# Patient Record
Sex: FEMALE | Race: WHITE | Employment: OTHER | URBAN - METROPOLITAN AREA
[De-identification: names, ages, dates, MRNs, and addresses within clinical notes are randomized per-mention and may not be internally consistent; named-entity substitution may affect disease eponyms.]

---

## 2017-02-07 ENCOUNTER — TELEPHONE (OUTPATIENT)
Dept: DERMATOLOGY CLINIC | Facility: CLINIC | Age: 62
End: 2017-02-07

## 2017-02-08 RX ORDER — AZELAIC ACID 0.15 G/G
GEL TOPICAL
Qty: 50 G | Refills: 0 | Status: SHIPPED | OUTPATIENT
Start: 2017-02-08 | End: 2018-01-25

## 2017-02-08 NOTE — TELEPHONE ENCOUNTER
ERX'd 1 RF for Finacea to [facility].  Left detailed message for pt to confirm this was done and remind about f/u on Feb 15.

## 2017-02-15 ENCOUNTER — OFFICE VISIT (OUTPATIENT)
Dept: DERMATOLOGY CLINIC | Facility: CLINIC | Age: 62
End: 2017-02-15

## 2017-02-15 DIAGNOSIS — D23.9 BENIGN NEOPLASM OF SKIN, UNSPECIFIED LOCATION: ICD-10-CM

## 2017-02-15 DIAGNOSIS — L82.1 SEBORRHEIC KERATOSES: ICD-10-CM

## 2017-02-15 DIAGNOSIS — L71.9 ROSACEA: ICD-10-CM

## 2017-02-15 DIAGNOSIS — L81.4 LENTIGO: ICD-10-CM

## 2017-02-15 DIAGNOSIS — L57.0 AK (ACTINIC KERATOSIS): Primary | ICD-10-CM

## 2017-02-15 PROCEDURE — 17000 DESTRUCT PREMALG LESION: CPT | Performed by: DERMATOLOGY

## 2017-02-15 PROCEDURE — 99213 OFFICE O/P EST LOW 20 MIN: CPT | Performed by: DERMATOLOGY

## 2017-02-15 RX ORDER — POVIDONE/POLYVINYL ALCOHOL 20; 27 G/1000ML; G/1000ML
SOLUTION/ DROPS OPHTHALMIC
Refills: 0 | COMMUNITY
Start: 2017-02-08 | End: 2017-11-03

## 2017-02-15 NOTE — PROGRESS NOTES
Darci Aguirre is a 57year old female. HPI:     CC:  Patient presents with:  Rosacea: LOV 10/12/2015. Pt presenting with rosacea. Currently using Finacea for treatment. Lesion: Pt presenting with lesions to temples.  Pt denies personal and family hx of HCl (DYNACIN) 75 MG Oral Tab take 1 tablet (75MG)  by oral route  every dayTake  by mouth.  take 1 tablet (75MG)  by oral route  every day Disp: 30 tablet Rfl: 6     Allergies:   No Known Allergies    Past Medical History   Diagnosis Date   • Fracture of ri with:  Rosacea: LOV 10/12/2015. Pt presenting with rosacea. Currently using Finacea for treatment. Lesion: Pt presenting with lesions to temples. Pt denies personal and family hx of skin cancer. Patient presents with concerns above.     Patient has be nature discussed. Patient will let us know how they are doing over the next several weeks. Await clinical response to above therapy. Flares intermittently add metrocr. Continue finacea she lines gel.   Po takes rarely    Mult sk's at hairline, arms ba

## 2017-11-03 ENCOUNTER — OFFICE VISIT (OUTPATIENT)
Dept: INTERNAL MEDICINE CLINIC | Facility: CLINIC | Age: 62
End: 2017-11-03

## 2017-11-03 VITALS
HEIGHT: 62 IN | BODY MASS INDEX: 25.58 KG/M2 | WEIGHT: 139 LBS | OXYGEN SATURATION: 96 % | HEART RATE: 74 BPM | SYSTOLIC BLOOD PRESSURE: 116 MMHG | TEMPERATURE: 99 F | DIASTOLIC BLOOD PRESSURE: 84 MMHG | RESPIRATION RATE: 18 BRPM

## 2017-11-03 DIAGNOSIS — E78.1 HYPERTRIGLYCERIDEMIA: ICD-10-CM

## 2017-11-03 DIAGNOSIS — E55.9 VITAMIN D DEFICIENCY: ICD-10-CM

## 2017-11-03 DIAGNOSIS — M15.9 PRIMARY OSTEOARTHRITIS INVOLVING MULTIPLE JOINTS: ICD-10-CM

## 2017-11-03 DIAGNOSIS — M47.812 OSTEOARTHRITIS OF CERVICAL SPINE, UNSPECIFIED SPINAL OSTEOARTHRITIS COMPLICATION STATUS: ICD-10-CM

## 2017-11-03 DIAGNOSIS — Z00.00 ANNUAL PHYSICAL EXAM: Primary | ICD-10-CM

## 2017-11-03 DIAGNOSIS — M54.31 SCIATICA OF RIGHT SIDE: ICD-10-CM

## 2017-11-03 DIAGNOSIS — E78.9 BORDERLINE HIGH CHOLESTEROL: ICD-10-CM

## 2017-11-03 DIAGNOSIS — R53.83 FATIGUE, UNSPECIFIED TYPE: ICD-10-CM

## 2017-11-03 DIAGNOSIS — M79.604 PAIN OF RIGHT LOWER EXTREMITY: ICD-10-CM

## 2017-11-03 DIAGNOSIS — L71.9 ROSACEA: ICD-10-CM

## 2017-11-03 PROBLEM — M15.0 PRIMARY OSTEOARTHRITIS INVOLVING MULTIPLE JOINTS: Status: ACTIVE | Noted: 2017-11-03

## 2017-11-03 PROCEDURE — 99214 OFFICE O/P EST MOD 30 MIN: CPT | Performed by: INTERNAL MEDICINE

## 2017-11-03 PROCEDURE — 93005 ELECTROCARDIOGRAM TRACING: CPT | Performed by: INTERNAL MEDICINE

## 2017-11-03 PROCEDURE — 99396 PREV VISIT EST AGE 40-64: CPT | Performed by: INTERNAL MEDICINE

## 2017-11-03 PROCEDURE — 93010 ELECTROCARDIOGRAM REPORT: CPT | Performed by: INTERNAL MEDICINE

## 2017-11-03 NOTE — PATIENT INSTRUCTIONS
1.  Patient is to continue her current diet, medications and activity. 2.  Patient is to take ibuprofen 2 tablets 3 or 4 times a day with meals or food for 2 weeks. She then may use ibuprofen as needed after that.   3.  Patient to call me in 2 or 3 weeks

## 2017-11-03 NOTE — PROGRESS NOTES
HPI:   Rachel Love is a 57year old female who was seen by me on November 3, 2017 for her annual physical examination. At the time the examination Mrs. Betsy Villegas was feeling well. She does complain of some pain involving her right leg.   She notes alexa Smokeless tobacco: Never Used                      Comment: Please verify with patient. Per NextGen:             \"Tobacco Use - Former. \"  Alcohol use: Yes           0.0 oz/week     Comment: (beer & w patient did not have blood work taken prior to today's exam.  At the conclusion of the exam the patient was given order for blood tests and urinalysis. When these are available over discuss results with the patient. ASSESSMENT AND PLAN:   1.  Annual p PANEL; Future    5. Hypertriglyceridemia  Patient had a history of elevated triglyceride readings in the past.  I will await the results of the patient's lipid panel which will be done in the near future.   I will discuss results with the patient when they How would you describe your daily physical activity?: Light    How would you describe your current health state?: Good    How do you maintain positive mental well-being?: Social Interaction; Visiting Friends; Visiting Family    If you are a male age 36-11 carline:  No I have trouble hearing conversations in a noisy background such as a crowded room or restaurant:  Sometimes   I get confused about where sounds come from:  No I misunderstand some words in a sentence and need to ask people to repeat themselve

## 2017-11-16 ENCOUNTER — LAB ENCOUNTER (OUTPATIENT)
Dept: LAB | Facility: HOSPITAL | Age: 62
End: 2017-11-16
Attending: INTERNAL MEDICINE
Payer: COMMERCIAL

## 2017-11-16 DIAGNOSIS — E78.9 BORDERLINE HIGH CHOLESTEROL: ICD-10-CM

## 2017-11-16 DIAGNOSIS — E55.9 VITAMIN D DEFICIENCY: ICD-10-CM

## 2017-11-16 DIAGNOSIS — R53.83 FATIGUE, UNSPECIFIED TYPE: ICD-10-CM

## 2017-11-16 DIAGNOSIS — Z00.00 ANNUAL PHYSICAL EXAM: ICD-10-CM

## 2017-11-16 DIAGNOSIS — E78.1 HYPERTRIGLYCERIDEMIA: ICD-10-CM

## 2017-11-16 PROCEDURE — 36415 COLL VENOUS BLD VENIPUNCTURE: CPT

## 2017-11-16 PROCEDURE — 80053 COMPREHEN METABOLIC PANEL: CPT

## 2017-11-16 PROCEDURE — 84443 ASSAY THYROID STIM HORMONE: CPT

## 2017-11-16 PROCEDURE — 85025 COMPLETE CBC W/AUTO DIFF WBC: CPT

## 2017-11-16 PROCEDURE — 80061 LIPID PANEL: CPT

## 2017-11-16 PROCEDURE — 82306 VITAMIN D 25 HYDROXY: CPT

## 2017-11-16 PROCEDURE — 81003 URINALYSIS AUTO W/O SCOPE: CPT

## 2017-11-21 ENCOUNTER — TELEPHONE (OUTPATIENT)
Dept: INTERNAL MEDICINE CLINIC | Facility: CLINIC | Age: 62
End: 2017-11-21

## 2017-11-21 DIAGNOSIS — E78.1 HYPERTRIGLYCERIDEMIA: Primary | ICD-10-CM

## 2017-11-21 NOTE — TELEPHONE ENCOUNTER
Telephone call to pt and message left. Pt's recent blood tests and U/A have turned out well except that her Triglycerides are elevate to 280. Pt is to watch her diet especially sweets and carbs and alcohol if she is drinking any.   Please mail pt a copy of

## 2017-11-22 NOTE — TELEPHONE ENCOUNTER
Pt notified labs and ua ok / DR KAPLAN   trigycerides elevated at 280--pt to watch diet nahomy sweets  Carbohydrates and alcohol  AHA diet mailed to pt  Repeat lipid in Feb  orders entered in computer   Pt understands all instructions

## 2018-01-17 ENCOUNTER — OFFICE VISIT (OUTPATIENT)
Dept: INTERNAL MEDICINE CLINIC | Facility: CLINIC | Age: 63
End: 2018-01-17

## 2018-01-17 VITALS
SYSTOLIC BLOOD PRESSURE: 130 MMHG | DIASTOLIC BLOOD PRESSURE: 76 MMHG | HEART RATE: 68 BPM | WEIGHT: 140 LBS | OXYGEN SATURATION: 98 % | BODY MASS INDEX: 26 KG/M2 | TEMPERATURE: 98 F

## 2018-01-17 DIAGNOSIS — E78.1 HYPERTRIGLYCERIDEMIA: ICD-10-CM

## 2018-01-17 DIAGNOSIS — M25.532 LEFT WRIST PAIN: Primary | ICD-10-CM

## 2018-01-17 DIAGNOSIS — M67.40 GANGLION CYST: ICD-10-CM

## 2018-01-17 PROCEDURE — 99212 OFFICE O/P EST SF 10 MIN: CPT | Performed by: INTERNAL MEDICINE

## 2018-01-17 PROCEDURE — 99213 OFFICE O/P EST LOW 20 MIN: CPT | Performed by: INTERNAL MEDICINE

## 2018-01-17 NOTE — PROGRESS NOTES
Yessenia Grover is a 57year old female. Patient presents with:  Wrist Pain: left   swelling     HPI:   Patient presents with:  Wrist Pain: left   swelling     Patient has developed some swelling associated with some pain in her left medial wrist which is Wt 140 lb (63.5 kg)   SpO2 98%   BMI 25.61 kg/m²   GENERAL: well developed, well nourished in no acute distress  HEENT: normal oropharynx,. Ears are normal. Eyes are normal  EXTREMITIES: no edema.   His left medial wrist region has 3 areas of small ganglio

## 2018-01-17 NOTE — PATIENT INSTRUCTIONS
1.  Patient is to continue her current diet, medications and activity. 2.  Patient to apply warm moist compresses to the left medial wrist 3 or 4 times a day for 10-15 minutes at a time.   3.  Patient take ibuprofen 2 tablets 3 or 4 times a day with food o

## 2018-01-25 ENCOUNTER — TELEPHONE (OUTPATIENT)
Dept: DERMATOLOGY CLINIC | Facility: CLINIC | Age: 63
End: 2018-01-25

## 2018-01-25 RX ORDER — AZELAIC ACID 0.15 G/G
GEL TOPICAL
Qty: 50 G | Refills: 6 | Status: SHIPPED | OUTPATIENT
Start: 2018-01-25 | End: 2018-05-11

## 2018-04-23 ENCOUNTER — OFFICE VISIT (OUTPATIENT)
Dept: SURGERY | Facility: CLINIC | Age: 63
End: 2018-04-23

## 2018-04-23 ENCOUNTER — TELEPHONE (OUTPATIENT)
Dept: SURGERY | Facility: CLINIC | Age: 63
End: 2018-04-23

## 2018-04-23 DIAGNOSIS — M67.432 GANGLION OF LEFT WRIST: Primary | ICD-10-CM

## 2018-04-23 PROCEDURE — G0463 HOSPITAL OUTPT CLINIC VISIT: HCPCS | Performed by: PLASTIC SURGERY

## 2018-04-23 PROCEDURE — 99203 OFFICE O/P NEW LOW 30 MIN: CPT | Performed by: PLASTIC SURGERY

## 2018-04-23 RX ORDER — HYDROCODONE BITARTRATE AND ACETAMINOPHEN 7.5; 325 MG/1; MG/1
TABLET ORAL
Qty: 25 TABLET | Refills: 0 | Status: SHIPPED | OUTPATIENT
Start: 2018-04-23 | End: 2018-04-25

## 2018-04-23 NOTE — H&P
Koffi Bhatia is a 70year old female that presents with Patient presents with:  Ganglion: L volar wrist  .    REFERRED BY:  Matthew Roach    Pacemaker: No  Latex Allergy: no  Coumadin: No  Plavix: No  Other anticoagulants: No  Cardiac stents: No per NextGen   • Osteoporosis screening 05-    per NextGen   • Other ill-defined conditions(799.89) 2008    per NextGen:  \"bulging discs; management:  surgery\"   • Rosacea     Management:  tetracycline p.r.n. Past Surgical History:  2008:  MU wrist 3 x 2 cm multilobulated FCR ganglion, tender  Pain with flexion against  James 2 seconds ulnar, 2 seconds radial    Left thumb CMC  Dorsal subluxation  Dorsal tenderness  Volar tenderness  Grind and crepitation negative  Traction pressure positive

## 2018-04-23 NOTE — PROGRESS NOTES
Pt request for surgery signed by pt and witnessed and signed by RN. Prescription for Norco and narcotic hand-out instruction sheet given to and reviewed w/patient.     Pre-Surgical Instruction Handout, Hand Elevation Handout, Dressing Protector Handout, an

## 2018-04-24 ENCOUNTER — HOSPITAL ENCOUNTER (OUTPATIENT)
Facility: HOSPITAL | Age: 63
Setting detail: HOSPITAL OUTPATIENT SURGERY
Discharge: HOME | End: 2018-04-24
Attending: PLASTIC SURGERY | Admitting: PLASTIC SURGERY
Payer: MEDICARE

## 2018-04-24 ENCOUNTER — SURGERY (OUTPATIENT)
Age: 63
End: 2018-04-24

## 2018-04-24 ENCOUNTER — ANESTHESIA EVENT (OUTPATIENT)
Dept: SURGERY | Facility: HOSPITAL | Age: 63
End: 2018-04-24
Payer: MEDICARE

## 2018-04-24 ENCOUNTER — ANESTHESIA (OUTPATIENT)
Dept: SURGERY | Facility: HOSPITAL | Age: 63
End: 2018-04-24
Payer: MEDICARE

## 2018-04-24 ENCOUNTER — HOSPITAL DOCUMENTATION (OUTPATIENT)
Dept: SURGERY | Facility: CLINIC | Age: 63
End: 2018-04-24

## 2018-04-24 VITALS
HEART RATE: 77 BPM | RESPIRATION RATE: 16 BRPM | BODY MASS INDEX: 25.98 KG/M2 | HEIGHT: 62 IN | OXYGEN SATURATION: 96 % | DIASTOLIC BLOOD PRESSURE: 74 MMHG | SYSTOLIC BLOOD PRESSURE: 141 MMHG | TEMPERATURE: 98 F | WEIGHT: 141.19 LBS

## 2018-04-24 DIAGNOSIS — M67.40 GANGLION: ICD-10-CM

## 2018-04-24 DIAGNOSIS — M67.432 GANGLION OF LEFT WRIST: Primary | ICD-10-CM

## 2018-04-24 PROCEDURE — 0LB60ZZ EXCISION OF LEFT LOWER ARM AND WRIST TENDON, OPEN APPROACH: ICD-10-PCS | Performed by: PLASTIC SURGERY

## 2018-04-24 PROCEDURE — 25111 REMOVE WRIST TENDON LESION: CPT | Performed by: PLASTIC SURGERY

## 2018-04-24 RX ORDER — HALOPERIDOL 5 MG/ML
0.25 INJECTION INTRAMUSCULAR ONCE AS NEEDED
Status: DISCONTINUED | OUTPATIENT
Start: 2018-04-24 | End: 2018-04-24

## 2018-04-24 RX ORDER — FAMOTIDINE 20 MG/1
20 TABLET ORAL ONCE
Status: DISCONTINUED | OUTPATIENT
Start: 2018-04-24 | End: 2018-04-24 | Stop reason: HOSPADM

## 2018-04-24 RX ORDER — HYDROMORPHONE HYDROCHLORIDE 1 MG/ML
0.2 INJECTION, SOLUTION INTRAMUSCULAR; INTRAVENOUS; SUBCUTANEOUS EVERY 5 MIN PRN
Status: DISCONTINUED | OUTPATIENT
Start: 2018-04-24 | End: 2018-04-24

## 2018-04-24 RX ORDER — HYDROMORPHONE HYDROCHLORIDE 1 MG/ML
0.4 INJECTION, SOLUTION INTRAMUSCULAR; INTRAVENOUS; SUBCUTANEOUS EVERY 5 MIN PRN
Status: DISCONTINUED | OUTPATIENT
Start: 2018-04-24 | End: 2018-04-24

## 2018-04-24 RX ORDER — HYDROCODONE BITARTRATE AND ACETAMINOPHEN 7.5; 325 MG/1; MG/1
1 TABLET ORAL EVERY 4 HOURS PRN
Status: DISCONTINUED | OUTPATIENT
Start: 2018-04-24 | End: 2018-04-24

## 2018-04-24 RX ORDER — ACETAMINOPHEN 500 MG
1000 TABLET ORAL ONCE
Status: COMPLETED | OUTPATIENT
Start: 2018-04-24 | End: 2018-04-24

## 2018-04-24 RX ORDER — HYDROCODONE BITARTRATE AND ACETAMINOPHEN 5; 325 MG/1; MG/1
2 TABLET ORAL AS NEEDED
Status: DISCONTINUED | OUTPATIENT
Start: 2018-04-24 | End: 2018-04-24

## 2018-04-24 RX ORDER — MIDAZOLAM HYDROCHLORIDE 1 MG/ML
INJECTION INTRAMUSCULAR; INTRAVENOUS AS NEEDED
Status: DISCONTINUED | OUTPATIENT
Start: 2018-04-24 | End: 2018-04-24 | Stop reason: SURG

## 2018-04-24 RX ORDER — HYDROMORPHONE HYDROCHLORIDE 1 MG/ML
0.6 INJECTION, SOLUTION INTRAMUSCULAR; INTRAVENOUS; SUBCUTANEOUS EVERY 5 MIN PRN
Status: DISCONTINUED | OUTPATIENT
Start: 2018-04-24 | End: 2018-04-24

## 2018-04-24 RX ORDER — HYDROCODONE BITARTRATE AND ACETAMINOPHEN 5; 325 MG/1; MG/1
1 TABLET ORAL AS NEEDED
Status: DISCONTINUED | OUTPATIENT
Start: 2018-04-24 | End: 2018-04-24

## 2018-04-24 RX ORDER — SODIUM CHLORIDE, SODIUM LACTATE, POTASSIUM CHLORIDE, CALCIUM CHLORIDE 600; 310; 30; 20 MG/100ML; MG/100ML; MG/100ML; MG/100ML
INJECTION, SOLUTION INTRAVENOUS CONTINUOUS
Status: DISCONTINUED | OUTPATIENT
Start: 2018-04-24 | End: 2018-04-24

## 2018-04-24 RX ORDER — ONDANSETRON 2 MG/ML
4 INJECTION INTRAMUSCULAR; INTRAVENOUS ONCE AS NEEDED
Status: DISCONTINUED | OUTPATIENT
Start: 2018-04-24 | End: 2018-04-24

## 2018-04-24 RX ORDER — KETOROLAC TROMETHAMINE 30 MG/ML
INJECTION, SOLUTION INTRAMUSCULAR; INTRAVENOUS AS NEEDED
Status: DISCONTINUED | OUTPATIENT
Start: 2018-04-24 | End: 2018-04-24 | Stop reason: SURG

## 2018-04-24 RX ORDER — NALOXONE HYDROCHLORIDE 0.4 MG/ML
80 INJECTION, SOLUTION INTRAMUSCULAR; INTRAVENOUS; SUBCUTANEOUS AS NEEDED
Status: DISCONTINUED | OUTPATIENT
Start: 2018-04-24 | End: 2018-04-24

## 2018-04-24 RX ORDER — METOCLOPRAMIDE 10 MG/1
10 TABLET ORAL ONCE
Status: DISCONTINUED | OUTPATIENT
Start: 2018-04-24 | End: 2018-04-24 | Stop reason: HOSPADM

## 2018-04-24 RX ADMIN — SODIUM CHLORIDE, SODIUM LACTATE, POTASSIUM CHLORIDE, CALCIUM CHLORIDE: 600; 310; 30; 20 INJECTION, SOLUTION INTRAVENOUS at 19:00:00

## 2018-04-24 RX ADMIN — SODIUM CHLORIDE, SODIUM LACTATE, POTASSIUM CHLORIDE, CALCIUM CHLORIDE: 600; 310; 30; 20 INJECTION, SOLUTION INTRAVENOUS at 18:25:00

## 2018-04-24 RX ADMIN — KETOROLAC TROMETHAMINE 30 MG: 30 INJECTION, SOLUTION INTRAMUSCULAR; INTRAVENOUS at 19:35:00

## 2018-04-24 RX ADMIN — MIDAZOLAM HYDROCHLORIDE 2 MG: 1 INJECTION INTRAMUSCULAR; INTRAVENOUS at 18:25:00

## 2018-04-24 NOTE — H&P (VIEW-ONLY)
Ioana Sandoval is a 70year old female that presents with Patient presents with:  Ganglion: L volar wrist  .    REFERRED BY:  Matthew Roach    Pacemaker: No  Latex Allergy: no  Coumadin: No  Plavix: No  Other anticoagulants: No  Cardiac stents: No per NextGen   • Osteoporosis screening 05-    per NextGen   • Other ill-defined conditions(799.89) 2008    per NextGen:  \"bulging discs; management:  surgery\"   • Rosacea     Management:  tetracycline p.r.n. Past Surgical History:  2008:  MU wrist 3 x 2 cm multilobulated FCR ganglion, tender  Pain with flexion against  James 2 seconds ulnar, 2 seconds radial    Left thumb CMC  Dorsal subluxation  Dorsal tenderness  Volar tenderness  Grind and crepitation negative  Traction pressure positive

## 2018-04-24 NOTE — INTERVAL H&P NOTE
Pre-op Diagnosis: Ganglion [M67.40]    The above referenced H&P was reviewed by Bill Ngo MD on 4/24/2018, the patient was examined and no significant changes have occurred in the patient's condition since the H&P was performed.   I discussed w

## 2018-04-24 NOTE — ANESTHESIA PREPROCEDURE EVALUATION
Anesthesia PreOp Note    HPI:     Don Reeves is a 70year old female who presents for preoperative consultation requested by: Caleb Gimenez MD    Date of Surgery: 4/24/2018    Procedure(s):  HAND GANGLION EXCISION  Indication: Ganglion [N96. Intravenous Continuous Honorio Hernandez MD Last Rate: 20 mL/hr at 04/24/18 1518   famoTIDine (PEPCID) tab 20 mg 20 mg Oral Once Honorio Hernandez MD    Metoclopramide HCl (REGLAN) tab 10 mg 10 mg Oral Once Honorio Hernandez MD      [facility] full  Dental    (+) implants    Pulmonary - negative ROS and normal exam   Cardiovascular - negative ROS  Exercise tolerance: good    NYHA Classification: I  Rhythm: regular  Rate: normal  ROS comment: Stairs     Neuro/Psych - negative ROS   (+) neuromuscu

## 2018-04-25 ENCOUNTER — TELEPHONE (OUTPATIENT)
Dept: SURGERY | Facility: CLINIC | Age: 63
End: 2018-04-25

## 2018-04-25 NOTE — TELEPHONE ENCOUNTER
Spoke with pt.   \" I got sick on the Norco this am\"  Pt took 1/2 tablet of Norco 7.5 mg po last night before bed with no problems, but this am after eating breakfast she took another 1/2 of Norco and had emesis and sweats, which resolved shortly afterward

## 2018-04-25 NOTE — OPERATIVE REPORT
[facility]    PATIENT'S NAME: Raul Roca   ATTENDING PHYSICIAN: Chelsy Shane MD   OPERATING PHYSICIAN: Chelsy Shane MD   PATIENT ACCOUNT#:   [id number]    LOCATION:  [place] [address]  MEDICAL RECORD #:   [id number] volar splint. The tourniquet was released. Total tourniquet time 53 minutes. The patient tolerated the procedure well and left the operating suite in satisfactory condition.     Dictated By Chelsy Shane MD  d: 04/24/2018 19:48:46  t: 04/24/

## 2018-04-25 NOTE — ANESTHESIA POSTPROCEDURE EVALUATION
Patient: Nela Mcdonald    Procedure Summary     Date:  04/24/18 Room / Location:  [address] [place] [address] MAIN OR 01 / [facility] OR    Anesthesia Start:  1825 Anesthesia Stop:  1942    Procedure:  HAND GANGLION EXCISION (Left Hand) Diagnosis:       Ganglion      (Ganglion [

## 2018-04-25 NOTE — BRIEF OP NOTE
Pre-Operative Diagnosis: Ganglion [M67.40]     Post-Operative Diagnosis: Ganglion [M67.40]      Procedure Performed:   Procedure(s):  GANGLION EXCISION LEFT WRIST    Surgeon(s) and Role:     * April Joy MD - Primary    Assistant(s):        Kelsey

## 2018-04-30 ENCOUNTER — TELEPHONE (OUTPATIENT)
Dept: SURGERY | Facility: CLINIC | Age: 63
End: 2018-04-30

## 2018-04-30 NOTE — TELEPHONE ENCOUNTER
Pt dropped forms that need to be completed stating when she can go back to work and whether she has any limitations that would interfere with her duties. Forms given to nurse.

## 2018-04-30 NOTE — TELEPHONE ENCOUNTER
Pt called and notified any work forms that need to be completed need to be dropped off and completed by MARY. Pt given their phone number and their location. Told we would hold on to her forms till she picks them up. Verbalized understanding.

## 2018-05-03 ENCOUNTER — TELEPHONE (OUTPATIENT)
Dept: SURGERY | Facility: CLINIC | Age: 63
End: 2018-05-03

## 2018-05-03 NOTE — TELEPHONE ENCOUNTER
RTW form received in MARY+ Signed release, n/c. Spoke w/ pt, informed n/c, advised on next appt 05/08 to ask for RTW letter. Logged for processing.  NK

## 2018-05-07 NOTE — TELEPHONE ENCOUNTER
Dr. Tim Mckeon,     Patient has appt tomorrow 5-8-18. Form below is a Return to work form only. She would like to return to work on 5-14-18. I scanned it in blank but will fill it out tomorrow after her appointment. Please provide the return to work details in her office note. (restrictions or full duty)  Please electronicly sign. I will mirror your office notes. Please sign off on form:  -Highlight the patient and hit \"Chart\" button. -In Chart Review, w/in the Encounter tab - click 1 time on the Telephone call encounter for 5-3-18. Scroll down the telephone encounter.  -Click \"scan on\" blue Hyperlink under \"Media\" heading for PPD Dr. Tim Mckeon 5-7-18.  w/in the telephone enc.  -Click on Acknowledge button at the bottom right corner and left-click onto image, signature stamp appears and drag signature to Provider signature line. Stamp will turn blue. Close window.     Thank you,  Andrez Woodson

## 2018-05-08 ENCOUNTER — NURSE ONLY (OUTPATIENT)
Dept: SURGERY | Facility: CLINIC | Age: 63
End: 2018-05-08

## 2018-05-08 ENCOUNTER — APPOINTMENT (OUTPATIENT)
Dept: SURGERY | Facility: CLINIC | Age: 63
End: 2018-05-08

## 2018-05-08 DIAGNOSIS — M62.81 DISTAL MUSCLE WEAKNESS: ICD-10-CM

## 2018-05-08 DIAGNOSIS — M67.432 GANGLION OF LEFT WRIST: ICD-10-CM

## 2018-05-08 DIAGNOSIS — Z48.02 ENCOUNTER FOR REMOVAL OF SUTURES: Primary | ICD-10-CM

## 2018-05-08 DIAGNOSIS — M25.642 STIFFNESS OF JOINT, HAND, LEFT: Primary | ICD-10-CM

## 2018-05-08 PROCEDURE — 97110 THERAPEUTIC EXERCISES: CPT | Performed by: OCCUPATIONAL THERAPIST

## 2018-05-08 PROCEDURE — G0463 HOSPITAL OUTPT CLINIC VISIT: HCPCS | Performed by: PLASTIC SURGERY

## 2018-05-08 PROCEDURE — 97165 OT EVAL LOW COMPLEX 30 MIN: CPT | Performed by: OCCUPATIONAL THERAPIST

## 2018-05-08 RX ORDER — HYDROCODONE BITARTRATE AND ACETAMINOPHEN 7.5; 325 MG/1; MG/1
TABLET ORAL
Refills: 0 | COMMUNITY
Start: 2018-04-24 | End: 2018-05-11 | Stop reason: ALTCHOICE

## 2018-05-08 NOTE — PROGRESS NOTES
Surgery 1: L FCR ganglion  - Date: 18  - Days Since: 15  Pt is in the office today for Nurse visit for suture removal of LH. Pt identified by name and . Orders reviewed. Intermittent incisional pain. Rates pain 7/10.   Taking advil prn, provide

## 2018-05-09 NOTE — PROGRESS NOTES
OCCUPATIONAL THERAPY EVALUATION:   Patsy Blair   [id number]       SUBJECTIVE:    HX of Injury: L FCR ganglion  Chief Complaint:   None.     Precautions: None  Premorbid Functional Status: Independent w/ Occ. duties, Independent w/ driving / sitting, In this evaluation and agrees to the plan of care. CONOR Rubio     I have reviewed the treatment plan and concur.    Petar Mandujano MD

## 2018-05-11 ENCOUNTER — OFFICE VISIT (OUTPATIENT)
Dept: DERMATOLOGY CLINIC | Facility: CLINIC | Age: 63
End: 2018-05-11

## 2018-05-11 ENCOUNTER — TELEPHONE (OUTPATIENT)
Dept: DERMATOLOGY CLINIC | Facility: CLINIC | Age: 63
End: 2018-05-11

## 2018-05-11 DIAGNOSIS — L71.9 ROSACEA: Primary | ICD-10-CM

## 2018-05-11 DIAGNOSIS — D23.9 BENIGN NEOPLASM OF SKIN, UNSPECIFIED LOCATION: ICD-10-CM

## 2018-05-11 DIAGNOSIS — L81.4 LENTIGO: ICD-10-CM

## 2018-05-11 PROCEDURE — 99213 OFFICE O/P EST LOW 20 MIN: CPT | Performed by: DERMATOLOGY

## 2018-05-11 PROCEDURE — G0463 HOSPITAL OUTPT CLINIC VISIT: HCPCS | Performed by: DERMATOLOGY

## 2018-05-11 RX ORDER — MINOCYCLINE HYDROCHLORIDE 50 MG/1
50 CAPSULE ORAL 2 TIMES DAILY
Qty: 60 CAPSULE | Refills: 1 | Status: SHIPPED | OUTPATIENT
Start: 2018-05-11 | End: 2018-06-10

## 2018-05-11 RX ORDER — IVERMECTIN 10 MG/G
1 CREAM TOPICAL DAILY
Qty: 1 TUBE | Refills: 3 | Status: SHIPPED | OUTPATIENT
Start: 2018-05-11 | End: 2019-11-04

## 2018-05-11 RX ORDER — AZELAIC ACID 0.15 G/G
GEL TOPICAL
Qty: 50 G | Refills: 6 | Status: SHIPPED | OUTPATIENT
Start: 2018-05-11 | End: 2019-01-31

## 2018-05-11 NOTE — TELEPHONE ENCOUNTER
Pt has the po for now. She has metrocream and finacea also. con't for now. Is pa possible? She was already aware this might not be covered or might be very expensive.

## 2018-05-14 ENCOUNTER — OFFICE VISIT (OUTPATIENT)
Dept: SURGERY | Facility: CLINIC | Age: 63
End: 2018-05-14

## 2018-05-14 DIAGNOSIS — M62.81 DISTAL MUSCLE WEAKNESS: ICD-10-CM

## 2018-05-14 DIAGNOSIS — M25.641 JOINT STIFFNESS OF HAND, RIGHT: Primary | ICD-10-CM

## 2018-05-14 DIAGNOSIS — M67.432 GANGLION OF LEFT WRIST: Primary | ICD-10-CM

## 2018-05-14 PROCEDURE — 99024 POSTOP FOLLOW-UP VISIT: CPT | Performed by: PLASTIC SURGERY

## 2018-05-14 PROCEDURE — G0463 HOSPITAL OUTPT CLINIC VISIT: HCPCS | Performed by: PLASTIC SURGERY

## 2018-05-14 PROCEDURE — G8988 SELF CARE GOAL STATUS: HCPCS | Performed by: OCCUPATIONAL THERAPIST

## 2018-05-14 PROCEDURE — G8989 SELF CARE D/C STATUS: HCPCS | Performed by: OCCUPATIONAL THERAPIST

## 2018-05-14 PROCEDURE — 97110 THERAPEUTIC EXERCISES: CPT | Performed by: OCCUPATIONAL THERAPIST

## 2018-05-14 NOTE — PROGRESS NOTES
Subjective:  I am returning to work. Objective:     Current level of performance:  ADL: Independent  Work: Ask for help as needed, Works for [employer] [address] [employer]. Leisure: Not discussed.     Measurements/Tests:  ROM:  Testing By: psm

## 2018-05-14 NOTE — PROGRESS NOTES
Surgery 1: L FCR ganglion  - Date: 04/24/18  - Days Since: 20    Pt here for post-op visit. Pt states that she has sensitivity and burning to L wrist scar when touched,  when flexing wrist, and buttoning buttons.   Pt c/o achiness to thenar area and entire

## 2018-05-21 NOTE — PROGRESS NOTES
Yessenia Grover is a 70year old female. HPI:     CC:  Patient presents with:  Rosacea: LOV: 2-15-17. Pt presents today for f/u currently using Finacea and metro cream still having frequent flare-ups that is occ tender and painful.          Allergies:  No Rfl: 3   Azelaic Acid (FINACEA) 15 % External Gel Use bid as directed to face Disp: 50 g Rfl: 6   metRONIDAZOLE 0.75 % External Cream Use bid to face Disp: 60 g Rfl: 6     Allergies:     Norco [Hydrocodone-*        Comment:emesis  Vicodin [Hydrocodon* Brother      Autoimmune disease of lungs       There were no vitals filed for this visit. HPI:  Patient presents with:  Rosacea: LOV: 2-15-17.  Pt presents today for f/u currently using Finacea and metro cream still having frequent flare-ups that is occ actinic keratosis. Mild erythema central face no active papules. No eyelid telangiectasia  . Rosacea. Meds in grid. Skin care instructions reviewed. Pathophysiology reviewed. Chronic recurrent nature discussed.   Patient will let us know how they are

## 2018-05-22 NOTE — TELEPHONE ENCOUNTER
Fax form requested further information, completed and faxed back. Placed in [address] book. Await response.

## 2018-05-23 ENCOUNTER — TELEPHONE (OUTPATIENT)
Dept: ADMINISTRATIVE | Age: 63
End: 2018-05-23

## 2018-05-23 NOTE — TELEPHONE ENCOUNTER
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 Logged for processing. Email sent to pt for fee.  NK

## 2018-05-23 NOTE — TELEPHONE ENCOUNTER
Pt stopped @[facility] 05/14/18 - Suzy revised form, faxed to Beto Carter: [phone], pt picked up copy, scanned, n/c for rtw forms.  NK

## 2018-06-05 NOTE — TELEPHONE ENCOUNTER
Dr. Julia Hussein,    Please sign off on form:  -Highlight the patient and hit \"Chart\" button. -In Chart Review, w/in the Encounter tab - click 1 time on the Telephone call encounter for 5/23/18. Scroll down the telephone encounter.  -Click \"scan on\" blue Hyperlink under \"Media\" heading for Disab Dr. Julia Hussein 6/5/18 w/in the telephone enc.  -Click on Acknowledge button at the bottom right corner and left-click onto image, signature stamp appears and drag signature to Provider signature line. Stamp will turn blue. Close window.     Thank you,  [facility]

## 2018-06-12 ENCOUNTER — TELEPHONE (OUTPATIENT)
Dept: DERMATOLOGY CLINIC | Facility: CLINIC | Age: 63
End: 2018-06-12

## 2018-06-12 NOTE — TELEPHONE ENCOUNTER
Yes if needed this is generic minocycline. Patient not controlled on topicals for her rosacea moderate to severe flares at times. Please check with pharmacy see if this is due to dosage formulation etc.?

## 2018-06-29 ENCOUNTER — APPOINTMENT (OUTPATIENT)
Dept: GENERAL RADIOLOGY | Facility: HOSPITAL | Age: 63
End: 2018-06-29
Payer: MEDICARE

## 2018-06-29 ENCOUNTER — HOSPITAL ENCOUNTER (EMERGENCY)
Facility: HOSPITAL | Age: 63
Discharge: HOME | End: 2018-06-29
Payer: MEDICARE

## 2018-06-29 VITALS
HEART RATE: 68 BPM | OXYGEN SATURATION: 98 % | SYSTOLIC BLOOD PRESSURE: 141 MMHG | WEIGHT: 138 LBS | BODY MASS INDEX: 25.4 KG/M2 | RESPIRATION RATE: 18 BRPM | TEMPERATURE: 99 F | HEIGHT: 62 IN | DIASTOLIC BLOOD PRESSURE: 83 MMHG

## 2018-06-29 DIAGNOSIS — S92.424A CLOSED NONDISPLACED FRACTURE OF DISTAL PHALANX OF RIGHT GREAT TOE, INITIAL ENCOUNTER: Primary | ICD-10-CM

## 2018-06-29 DIAGNOSIS — S02.2XXA CLOSED FRACTURE OF NASAL BONE, INITIAL ENCOUNTER: ICD-10-CM

## 2018-06-29 PROCEDURE — 73660 X-RAY EXAM OF TOE(S): CPT

## 2018-06-29 PROCEDURE — 28490 TREAT BIG TOE FRACTURE: CPT

## 2018-06-29 PROCEDURE — 99284 EMERGENCY DEPT VISIT MOD MDM: CPT

## 2018-06-29 PROCEDURE — 21310 HC CLOSED TX NASAL BONE FX WITHOUT MANIPULATION: CPT

## 2018-06-29 RX ORDER — MINOCYCLINE HYDROCHLORIDE 100 MG/1
100 CAPSULE ORAL 2 TIMES DAILY
Qty: 180 CAPSULE | Refills: 3 | COMMUNITY
Start: 2018-06-29 | End: 2019-01-31

## 2018-06-29 RX ORDER — IBUPROFEN 600 MG/1
600 TABLET ORAL ONCE
Status: COMPLETED | OUTPATIENT
Start: 2018-06-29 | End: 2018-06-29

## 2018-06-30 NOTE — ED INITIAL ASSESSMENT (HPI)
Mechanical fall at appx 1630 today, states she tripped on big toe, has pain, swelling to site, also states she landed on face, has swelling, pain to nose.

## 2018-06-30 NOTE — ED PROVIDER NOTES
Patient Seen in: [facility] AND [facility] Emergency Department    History   Patient presents with:  Fall (musculoskeletal, neurologic)    Stated Complaint: Fall    HPI    58-year-old female presents for evaluation after a fall.   Patient reports mechanical trip [06/29/18 2114]  Pulse: 72 [06/29/18 2114]  Resp: 18 [06/29/18 2114]  Temp: 99.1 °F (37.3 °C) [06/29/18 2114]  Temp src: n/a  SpO2: 96 % [06/29/18 2114]  O2 Device: None (Room air) [06/29/18 2344]    Current:/83   Pulse 68   Temp 99.1 °F (37.3 °C) well as strict return precautions. Discussed supportive care for toe fracture, nose fracture, outpatient follow-up. She verbalizes understanding of and agreement with plan.           Disposition and Plan     Clinical Impression:  Closed nondisplaced fract

## 2018-06-30 NOTE — ED NOTES
Post op shoe placed. Patient educated on home care and importance of PCP/Ortho FU. + C/M/S. RICE therapy explained and understood- patient given ice pack for home. Patient verbalizes understanding.  Work note provided per request. Patient denies any further

## 2019-01-31 ENCOUNTER — OFFICE VISIT (OUTPATIENT)
Dept: INTERNAL MEDICINE CLINIC | Facility: CLINIC | Age: 64
End: 2019-01-31
Payer: MEDICARE

## 2019-01-31 VITALS
SYSTOLIC BLOOD PRESSURE: 126 MMHG | OXYGEN SATURATION: 96 % | WEIGHT: 139.38 LBS | BODY MASS INDEX: 25.65 KG/M2 | HEART RATE: 76 BPM | DIASTOLIC BLOOD PRESSURE: 76 MMHG | TEMPERATURE: 98 F | HEIGHT: 62 IN

## 2019-01-31 DIAGNOSIS — M15.9 PRIMARY OSTEOARTHRITIS INVOLVING MULTIPLE JOINTS: ICD-10-CM

## 2019-01-31 DIAGNOSIS — E78.9 BORDERLINE HIGH CHOLESTEROL: ICD-10-CM

## 2019-01-31 DIAGNOSIS — Z00.00 ANNUAL PHYSICAL EXAM: Primary | ICD-10-CM

## 2019-01-31 DIAGNOSIS — R53.83 FATIGUE, UNSPECIFIED TYPE: ICD-10-CM

## 2019-01-31 DIAGNOSIS — M47.812 OSTEOARTHRITIS OF CERVICAL SPINE, UNSPECIFIED SPINAL OSTEOARTHRITIS COMPLICATION STATUS: ICD-10-CM

## 2019-01-31 DIAGNOSIS — E55.9 VITAMIN D DEFICIENCY: ICD-10-CM

## 2019-01-31 DIAGNOSIS — E78.1 HYPERTRIGLYCERIDEMIA: ICD-10-CM

## 2019-01-31 DIAGNOSIS — L71.9 ROSACEA: ICD-10-CM

## 2019-01-31 DIAGNOSIS — M79.89 PAIN AND SWELLING OF TOE OF LEFT FOOT: ICD-10-CM

## 2019-01-31 DIAGNOSIS — M79.675 PAIN AND SWELLING OF TOE OF LEFT FOOT: ICD-10-CM

## 2019-01-31 PROCEDURE — G0009 ADMIN PNEUMOCOCCAL VACCINE: HCPCS | Performed by: INTERNAL MEDICINE

## 2019-01-31 PROCEDURE — 90471 IMMUNIZATION ADMIN: CPT | Performed by: INTERNAL MEDICINE

## 2019-01-31 PROCEDURE — G0405 EKG INTERPRET & REPORT PREVE: HCPCS | Performed by: INTERNAL MEDICINE

## 2019-01-31 PROCEDURE — 99214 OFFICE O/P EST MOD 30 MIN: CPT | Performed by: INTERNAL MEDICINE

## 2019-01-31 PROCEDURE — 90715 TDAP VACCINE 7 YRS/> IM: CPT | Performed by: INTERNAL MEDICINE

## 2019-01-31 PROCEDURE — G0402 INITIAL PREVENTIVE EXAM: HCPCS | Performed by: INTERNAL MEDICINE

## 2019-01-31 PROCEDURE — G0463 HOSPITAL OUTPT CLINIC VISIT: HCPCS | Performed by: INTERNAL MEDICINE

## 2019-01-31 PROCEDURE — 90670 PCV13 VACCINE IM: CPT | Performed by: INTERNAL MEDICINE

## 2019-01-31 PROCEDURE — G0404 EKG TRACING FOR INITIAL PREV: HCPCS | Performed by: INTERNAL MEDICINE

## 2019-01-31 RX ORDER — MINOCYCLINE HYDROCHLORIDE 100 MG/1
100 CAPSULE ORAL 2 TIMES DAILY
Qty: 180 CAPSULE | Refills: 3 | Status: SHIPPED | OUTPATIENT
Start: 2019-01-31 | End: 2021-04-08

## 2019-01-31 NOTE — PATIENT INSTRUCTIONS
1.  Patient is to continue her current diet, medication and activity. 2.  Patient will be given her Prevnar vaccine and Tdap vaccine today. 3.  Patient is to consider obtaining her shingles vaccine, Shingrix, from her pharmacy.   4.  I will obtain an x-ra

## 2019-02-01 NOTE — PROGRESS NOTES
HPI:   Vicki Akins is a 70year old female who was seen by me on January 31, 2019 for her initial Medicare annual physical examination. At the time of examination Mrs. Eligio Rogers was feeling well.   She notes that her left wrist feels better following g by Manuela Clarke MD at [facility] OR   • [address] [facility]   2008    (due to \"bulging discs\")   • ORAL SURGERY     • OTHER SURGICAL HISTORY  2008    Cervical discectomy      Family History   Problem Relation Age of Onset   • Hyperten clear to auscultation  CARDIO: RRR, normal S1S2, no murmurs  GI: Protuberant, BS are present, no masses or organomegaly  MUSCULOSKELETAL: back is not tender, no pain or swelling in her legs  EXTREMITIES: no edema, all pulses are intact  NEURO;  Alert and or osteoarthritis complication status  Patient continues to have some pain in her neck and shoulder. I have recommended that she see Dr. Kathy Singh for evaluation of her neck pain.     3. Primary osteoarthritis involving multiple joints  Patient appears stab your current health state?: Good    How do you maintain positive mental well-being?: Social Interaction; Visiting Friends; Visiting Family    If you are a male age 38-65 or a female age 47-67, do you take aspirin?: No    Have you had any immunizations at ano noisy background such as a crowded room or restaurant:  No   I get confused about where sounds come from:  No I misunderstand some words in a sentence and need to ask people to repeat themselves:  No   I especially have trouble understanding the speech of Colonoscopy Screen every 10 years Colonoscopy due on 09/20/2006 Update [facility] if applicable    Flex Sigmoidoscopy Screen every 5 years No results found for this or any previous visit. No flowsheet data found.      Fecal Occult Blood Annually Date Value   05/06/2015 3.6    No flowsheet data found. Creatinine  Annually Creatinine (mg/dL)   Date Value   11/16/2017 0.69     CREATININE (P) (mg/dL)   Date Value   05/06/2015 0.68    No flowsheet data found.     Digoxin Serum Conc  Annually No res

## 2019-02-28 ENCOUNTER — LAB ENCOUNTER (OUTPATIENT)
Dept: LAB | Age: 64
End: 2019-02-28
Attending: OBSTETRICS & GYNECOLOGY
Payer: MEDICARE

## 2019-02-28 ENCOUNTER — HOSPITAL ENCOUNTER (OUTPATIENT)
Dept: MAMMOGRAPHY | Facility: HOSPITAL | Age: 64
Discharge: HOME | End: 2019-02-28
Attending: OBSTETRICS & GYNECOLOGY
Payer: MEDICARE

## 2019-02-28 ENCOUNTER — HOSPITAL ENCOUNTER (OUTPATIENT)
Dept: BONE DENSITY | Facility: HOSPITAL | Age: 64
Discharge: HOME | End: 2019-02-28
Attending: OBSTETRICS & GYNECOLOGY
Payer: MEDICARE

## 2019-02-28 DIAGNOSIS — M79.89 PAIN AND SWELLING OF TOE OF LEFT FOOT: ICD-10-CM

## 2019-02-28 DIAGNOSIS — R53.83 FATIGUE, UNSPECIFIED TYPE: ICD-10-CM

## 2019-02-28 DIAGNOSIS — Z12.31 VISIT FOR SCREENING MAMMOGRAM: ICD-10-CM

## 2019-02-28 DIAGNOSIS — E78.1 HYPERTRIGLYCERIDEMIA: ICD-10-CM

## 2019-02-28 DIAGNOSIS — Z00.00 ANNUAL PHYSICAL EXAM: ICD-10-CM

## 2019-02-28 DIAGNOSIS — M79.675 PAIN AND SWELLING OF TOE OF LEFT FOOT: ICD-10-CM

## 2019-02-28 DIAGNOSIS — E78.9 BORDERLINE HIGH CHOLESTEROL: ICD-10-CM

## 2019-02-28 DIAGNOSIS — E55.9 VITAMIN D DEFICIENCY: ICD-10-CM

## 2019-02-28 DIAGNOSIS — N95.1 POST MENOPAUSAL SYNDROME: ICD-10-CM

## 2019-02-28 LAB
ALBUMIN SERPL-MCNC: 3.8 G/DL (ref 3.4–5)
ALBUMIN/GLOB SERPL: 0.9 {RATIO} (ref 1–2)
ALP LIVER SERPL-CCNC: 65 U/L (ref 55–142)
ALT SERPL-CCNC: 26 U/L (ref 13–56)
ANION GAP SERPL CALC-SCNC: 5 MMOL/L (ref 0–18)
AST SERPL-CCNC: 18 U/L (ref 15–37)
BASOPHILS # BLD AUTO: 0.03 X10(3) UL (ref 0–0.2)
BASOPHILS NFR BLD AUTO: 0.5 %
BILIRUB SERPL-MCNC: 0.4 MG/DL (ref 0.1–2)
BILIRUB UR QL: NEGATIVE
BUN BLD-MCNC: 20 MG/DL (ref 7–18)
BUN/CREAT SERPL: 30.8 (ref 10–20)
CALCIUM BLD-MCNC: 8.8 MG/DL (ref 8.5–10.1)
CHLORIDE SERPL-SCNC: 107 MMOL/L (ref 98–107)
CHOLEST SMN-MCNC: 219 MG/DL (ref ?–200)
CLARITY UR: CLEAR
CO2 SERPL-SCNC: 29 MMOL/L (ref 21–32)
COLOR UR: YELLOW
CREAT BLD-MCNC: 0.65 MG/DL (ref 0.55–1.02)
DEPRECATED RDW RBC AUTO: 49.9 FL (ref 35.1–46.3)
EOSINOPHIL # BLD AUTO: 0.22 X10(3) UL (ref 0–0.7)
EOSINOPHIL NFR BLD AUTO: 3.4 %
ERYTHROCYTE [DISTWIDTH] IN BLOOD BY AUTOMATED COUNT: 14.8 % (ref 11–15)
GLOBULIN PLAS-MCNC: 4.1 G/DL (ref 2.8–4.4)
GLUCOSE BLD-MCNC: 95 MG/DL (ref 70–99)
GLUCOSE UR-MCNC: NEGATIVE MG/DL
HCT VFR BLD AUTO: 42.5 % (ref 35–48)
HDLC SERPL-MCNC: 51 MG/DL (ref 40–59)
HGB BLD-MCNC: 13.2 G/DL (ref 12–16)
HGB UR QL STRIP.AUTO: NEGATIVE
IMM GRANULOCYTES # BLD AUTO: 0.02 X10(3) UL (ref 0–1)
IMM GRANULOCYTES NFR BLD: 0.3 %
KETONES UR-MCNC: NEGATIVE MG/DL
LDLC SERPL CALC-MCNC: 132 MG/DL (ref ?–100)
LYMPHOCYTES # BLD AUTO: 1.73 X10(3) UL (ref 1–4)
LYMPHOCYTES NFR BLD AUTO: 26.4 %
M PROTEIN MFR SERPL ELPH: 7.9 G/DL (ref 6.4–8.2)
MCH RBC QN AUTO: 28.6 PG (ref 26–34)
MCHC RBC AUTO-ENTMCNC: 31.1 G/DL (ref 31–37)
MCV RBC AUTO: 92 FL (ref 80–100)
MONOCYTES # BLD AUTO: 0.38 X10(3) UL (ref 0.1–1)
MONOCYTES NFR BLD AUTO: 5.8 %
NEUTROPHILS # BLD AUTO: 4.18 X10 (3) UL (ref 1.5–7.7)
NEUTROPHILS # BLD AUTO: 4.18 X10(3) UL (ref 1.5–7.7)
NEUTROPHILS NFR BLD AUTO: 63.6 %
NITRITE UR QL STRIP.AUTO: NEGATIVE
NONHDLC SERPL-MCNC: 168 MG/DL (ref ?–130)
OSMOLALITY SERPL CALC.SUM OF ELEC: 294 MOSM/KG (ref 275–295)
PH UR: 5 [PH] (ref 5–8)
PLATELET # BLD AUTO: 281 10(3)UL (ref 150–450)
POTASSIUM SERPL-SCNC: 3.5 MMOL/L (ref 3.5–5.1)
PROT UR-MCNC: NEGATIVE MG/DL
RBC # BLD AUTO: 4.62 X10(6)UL (ref 3.8–5.3)
RBC #/AREA URNS AUTO: 0 /HPF
SODIUM SERPL-SCNC: 141 MMOL/L (ref 136–145)
SP GR UR STRIP: 1.02 (ref 1–1.03)
TRIGL SERPL-MCNC: 178 MG/DL (ref 30–149)
TSI SER-ACNC: 2.17 MIU/ML (ref 0.36–3.74)
URATE SERPL-MCNC: 6.7 MG/DL (ref 2.6–6)
UROBILINOGEN UR STRIP-ACNC: <2
VIT C UR-MCNC: NEGATIVE MG/DL
VLDLC SERPL CALC-MCNC: 36 MG/DL (ref 0–30)
WBC # BLD AUTO: 6.6 X10(3) UL (ref 4–11)
WBC #/AREA URNS AUTO: <1 /HPF

## 2019-02-28 PROCEDURE — 80053 COMPREHEN METABOLIC PANEL: CPT

## 2019-02-28 PROCEDURE — 77063 BREAST TOMOSYNTHESIS BI: CPT | Performed by: OBSTETRICS & GYNECOLOGY

## 2019-02-28 PROCEDURE — 80061 LIPID PANEL: CPT

## 2019-02-28 PROCEDURE — 36415 COLL VENOUS BLD VENIPUNCTURE: CPT

## 2019-02-28 PROCEDURE — 81001 URINALYSIS AUTO W/SCOPE: CPT

## 2019-02-28 PROCEDURE — 84443 ASSAY THYROID STIM HORMONE: CPT

## 2019-02-28 PROCEDURE — 84550 ASSAY OF BLOOD/URIC ACID: CPT

## 2019-02-28 PROCEDURE — 85025 COMPLETE CBC W/AUTO DIFF WBC: CPT

## 2019-02-28 PROCEDURE — 82306 VITAMIN D 25 HYDROXY: CPT

## 2019-02-28 PROCEDURE — 77080 DXA BONE DENSITY AXIAL: CPT | Performed by: OBSTETRICS & GYNECOLOGY

## 2019-02-28 PROCEDURE — 77067 SCR MAMMO BI INCL CAD: CPT | Performed by: OBSTETRICS & GYNECOLOGY

## 2019-03-01 LAB — 25(OH)D3 SERPL-MCNC: 27.1 NG/ML (ref 30–100)

## 2019-04-08 ENCOUNTER — TELEPHONE (OUTPATIENT)
Dept: INTERNAL MEDICINE CLINIC | Facility: CLINIC | Age: 64
End: 2019-04-08

## 2019-04-08 DIAGNOSIS — E78.9 BORDERLINE HIGH CHOLESTEROL: Primary | ICD-10-CM

## 2019-04-12 ENCOUNTER — OFFICE VISIT (OUTPATIENT)
Dept: INTERNAL MEDICINE CLINIC | Facility: CLINIC | Age: 64
End: 2019-04-12
Payer: MEDICARE

## 2019-04-12 VITALS
SYSTOLIC BLOOD PRESSURE: 130 MMHG | WEIGHT: 143 LBS | HEART RATE: 76 BPM | DIASTOLIC BLOOD PRESSURE: 80 MMHG | BODY MASS INDEX: 26 KG/M2 | OXYGEN SATURATION: 97 % | TEMPERATURE: 99 F

## 2019-04-12 DIAGNOSIS — K05.10 SUPERFICIAL INJURY OF GINGIVA WITH INFECTION, INITIAL ENCOUNTER: ICD-10-CM

## 2019-04-12 DIAGNOSIS — L71.9 ROSACEA: ICD-10-CM

## 2019-04-12 DIAGNOSIS — S00.502A SUPERFICIAL INJURY OF GINGIVA WITH INFECTION, INITIAL ENCOUNTER: ICD-10-CM

## 2019-04-12 DIAGNOSIS — J02.9 PHARYNGITIS, UNSPECIFIED ETIOLOGY: Primary | ICD-10-CM

## 2019-04-12 PROCEDURE — 99213 OFFICE O/P EST LOW 20 MIN: CPT | Performed by: INTERNAL MEDICINE

## 2019-04-12 PROCEDURE — G0463 HOSPITAL OUTPT CLINIC VISIT: HCPCS | Performed by: INTERNAL MEDICINE

## 2019-04-12 RX ORDER — AZITHROMYCIN 250 MG/1
TABLET, FILM COATED ORAL
Qty: 6 TABLET | Refills: 1 | Status: SHIPPED | OUTPATIENT
Start: 2019-04-12 | End: 2019-08-28 | Stop reason: ALTCHOICE

## 2019-04-12 NOTE — PROGRESS NOTES
Rachel Love is a 75year old female. Patient presents with:  Sore Throat: Patient had throat/tooth pain so she went to the dentist and was given abx. Eye Problem: Woke up with morning with swollen eye.      HPI:   Patient presents with:  Sore Throat: P Comment: (beer & wine) 1 glass weekly.   (Please verify, because per NextGen:  (beer & liquor) 2 drinks weekly.)    Drug use: No       REVIEW OF SYSTEMS:   GENERAL HEALTH: feels well otherwise  RESPIRATORY:No cough or SOB  CARDIOVASCULAR: No chest pain  GI:

## 2019-04-12 NOTE — PATIENT INSTRUCTIONS
1.  Patient is to continue her current diet, medications and activity. 2.  I will start the patient on Zithromax as prescribed. Patient should hold her minocycline while she is on the Zithromax.   3.  She may gargle with salt water 3 or 4 times during the

## 2019-04-15 ENCOUNTER — OFFICE VISIT (OUTPATIENT)
Dept: DERMATOLOGY CLINIC | Facility: CLINIC | Age: 64
End: 2019-04-15
Payer: MEDICARE

## 2019-04-15 DIAGNOSIS — D23.9 BENIGN NEOPLASM OF SKIN, UNSPECIFIED LOCATION: ICD-10-CM

## 2019-04-15 DIAGNOSIS — D23.4 BENIGN NEOPLASM OF SCALP AND SKIN OF NECK: ICD-10-CM

## 2019-04-15 DIAGNOSIS — L57.0 AK (ACTINIC KERATOSIS): Primary | ICD-10-CM

## 2019-04-15 DIAGNOSIS — L81.4 LENTIGO: ICD-10-CM

## 2019-04-15 DIAGNOSIS — L71.9 ROSACEA: ICD-10-CM

## 2019-04-15 DIAGNOSIS — D23.30 BENIGN NEOPLASM OF SKIN OF FACE: ICD-10-CM

## 2019-04-15 PROCEDURE — 99213 OFFICE O/P EST LOW 20 MIN: CPT | Performed by: DERMATOLOGY

## 2019-04-15 PROCEDURE — 17000 DESTRUCT PREMALG LESION: CPT | Performed by: DERMATOLOGY

## 2019-04-15 RX ORDER — TRIAMCINOLONE ACETONIDE 5 MG/G
CREAM TOPICAL
Qty: 15 G | Refills: 1 | Status: SHIPPED | OUTPATIENT
Start: 2019-04-15 | End: 2021-04-08

## 2019-04-28 NOTE — PROGRESS NOTES
Joseph Negron is a 75year old female. HPI:     CC:  Patient presents with:  Upper Body Exam: LOV: 5/11/18, pt presents for facial skin check. c/o new red lesion on left cheek. Denies any family or personal hx of skin cancer.         Allergies:  Sue Tran [ Rfl: 1   azithromycin (ZITHROMAX Z-DIGNA) 250 MG Oral Tab Take two tablets by mouth today, then one tablet daily.  Disp: 6 tablet Rfl: 1   metRONIDAZOLE 0.75 % External Cream Use bid to face Disp: 60 g Rfl: 6   Minocycline HCl (MINOCIN) 100 MG Oral Cap Take 1 Non-medical: Not on file    Tobacco Use      Smoking status: Never Smoker      Smokeless tobacco: Never Used    Substance and Sexual Activity      Alcohol use: Yes        Alcohol/week: 0.0 oz        Comment: (beer & wine) 1 glass weekly.   (Please verify, • Diabetes Father    • Dementia Mother 59   • Other (Other) Brother         Autoimmune disease of lungs       There were no vitals filed for this visit. HPI:  Patient presents with:  Upper Body Exam: LOV: 5/11/18, pt presents for facial skin check.  c/ skin         Ak (actinic keratosis)  (primary encounter diagnosis)  Lentigo  Benign neoplasm of skin of face  Rosacea  Benign neoplasm of scalp and skin of neck  Benign neoplasm of skin, unspecified location    No active actinic keratosis.     Diffuse eryth regarding other benign skin lesions. Signs and symptoms of skin cancer, ABCDE's of melanoma discussed with patient. Sunscreen use, sun protection, self exams reviewed. Followup as noted RTC ---routine checkup    6 mos -one year or p.r.n.     The patient ind

## 2019-08-27 ENCOUNTER — TELEPHONE (OUTPATIENT)
Dept: INTERNAL MEDICINE CLINIC | Facility: CLINIC | Age: 64
End: 2019-08-27

## 2019-08-27 NOTE — TELEPHONE ENCOUNTER
Needs to take immodium and come in to be seen by first available doc or to an immediate care for a minimum of cdiff testing, lisa call her

## 2019-08-27 NOTE — TELEPHONE ENCOUNTER
To Dr. John Multani, on call - see below - denies fever/chills at present. Vomited x1 this AM - \"stomach bile\". Denies nausea now - pt took a Cipro on an empty stomach which is why she threw up.   Cipro was given for [facility] trip - pt states she is unable to

## 2019-08-27 NOTE — TELEPHONE ENCOUNTER
Pt is calling because she has had diarrhea for about three days, but got worse yesterday and today, stools are clear. Pt states last night she had chills and a fever. Pt has not eaten anything since later yesterday afternoon.     Pt took Pepto-Bismol yester

## 2019-08-27 NOTE — TELEPHONE ENCOUNTER
Dr. Ricco Lizama message relayed to pt who verbalized understanding. Pt states she is unable to leave house due to diarrhea. Nursing to f/u.

## 2019-08-28 ENCOUNTER — LAB ENCOUNTER (OUTPATIENT)
Dept: LAB | Age: 64
End: 2019-08-28
Attending: INTERNAL MEDICINE
Payer: MEDICARE

## 2019-08-28 ENCOUNTER — APPOINTMENT (OUTPATIENT)
Dept: LAB | Facility: HOSPITAL | Age: 64
End: 2019-08-28
Attending: INTERNAL MEDICINE
Payer: MEDICARE

## 2019-08-28 ENCOUNTER — OFFICE VISIT (OUTPATIENT)
Dept: INTERNAL MEDICINE CLINIC | Facility: CLINIC | Age: 64
End: 2019-08-28
Payer: MEDICARE

## 2019-08-28 VITALS
WEIGHT: 143 LBS | BODY MASS INDEX: 26 KG/M2 | DIASTOLIC BLOOD PRESSURE: 76 MMHG | TEMPERATURE: 101 F | SYSTOLIC BLOOD PRESSURE: 126 MMHG | HEART RATE: 96 BPM | OXYGEN SATURATION: 97 %

## 2019-08-28 DIAGNOSIS — E78.9 BORDERLINE HIGH CHOLESTEROL: ICD-10-CM

## 2019-08-28 DIAGNOSIS — K52.9 GASTROENTERITIS: ICD-10-CM

## 2019-08-28 DIAGNOSIS — R19.7 DIARRHEA OF PRESUMED INFECTIOUS ORIGIN: ICD-10-CM

## 2019-08-28 DIAGNOSIS — R19.7 DIARRHEA OF PRESUMED INFECTIOUS ORIGIN: Primary | ICD-10-CM

## 2019-08-28 LAB
ADENOVIRUS F 40/41 PCR: NEGATIVE
ANION GAP SERPL CALC-SCNC: 9 MMOL/L (ref 0–18)
ASTROVIRUS PCR: NEGATIVE
BASOPHILS # BLD AUTO: 0.05 X10(3) UL (ref 0–0.2)
BASOPHILS NFR BLD AUTO: 0.3 %
BUN BLD-MCNC: 13 MG/DL (ref 7–18)
BUN/CREAT SERPL: 18.1 (ref 10–20)
C CAYETANENSIS DNA SPEC QL NAA+PROBE: NEGATIVE
C. DIFFICILE TOXIN A/B PCR: POSITIVE
CALCIUM BLD-MCNC: 9 MG/DL (ref 8.5–10.1)
CAMPY SP DNA.DIARRHEA STL QL NAA+PROBE: NEGATIVE
CHLORIDE SERPL-SCNC: 102 MMOL/L (ref 98–112)
CHOLEST SMN-MCNC: 154 MG/DL (ref ?–200)
CO2 SERPL-SCNC: 29 MMOL/L (ref 21–32)
CREAT BLD-MCNC: 0.72 MG/DL (ref 0.55–1.02)
CRYPTOSP DNA SPEC QL NAA+PROBE: NEGATIVE
DEPRECATED RDW RBC AUTO: 45.4 FL (ref 35.1–46.3)
EAEC PAA PLAS AGGR+AATA ST NAA+NON-PRB: NEGATIVE
EC STX1+STX2 + H7 FLIC SPEC NAA+PROBE: NEGATIVE
ENTAMOEBA HISTOLYTICA PCR: NEGATIVE
EOSINOPHIL # BLD AUTO: 0.22 X10(3) UL (ref 0–0.7)
EOSINOPHIL NFR BLD AUTO: 1.4 %
EPEC EAE GENE STL QL NAA+NON-PROBE: NEGATIVE
ERYTHROCYTE [DISTWIDTH] IN BLOOD BY AUTOMATED COUNT: 13.5 % (ref 11–15)
ETEC LTA+ST1A+ST1B TOX ST NAA+NON-PROBE: NEGATIVE
GIARDIA LAMBLIA PCR: NEGATIVE
GLUCOSE BLD-MCNC: 84 MG/DL (ref 70–99)
HCT VFR BLD AUTO: 38.2 % (ref 35–48)
HDLC SERPL-MCNC: 51 MG/DL (ref 40–59)
HGB BLD-MCNC: 12.1 G/DL (ref 12–16)
IMM GRANULOCYTES # BLD AUTO: 0.05 X10(3) UL (ref 0–1)
IMM GRANULOCYTES NFR BLD: 0.3 %
LDLC SERPL CALC-MCNC: 86 MG/DL (ref ?–100)
LYMPHOCYTES # BLD AUTO: 2.18 X10(3) UL (ref 1–4)
LYMPHOCYTES NFR BLD AUTO: 14.2 %
MCH RBC QN AUTO: 28.9 PG (ref 26–34)
MCHC RBC AUTO-ENTMCNC: 31.7 G/DL (ref 31–37)
MCV RBC AUTO: 91.4 FL (ref 80–100)
MONOCYTES # BLD AUTO: 0.86 X10(3) UL (ref 0.1–1)
MONOCYTES NFR BLD AUTO: 5.6 %
NEUTROPHILS # BLD AUTO: 11.98 X10 (3) UL (ref 1.5–7.7)
NEUTROPHILS # BLD AUTO: 11.98 X10(3) UL (ref 1.5–7.7)
NEUTROPHILS NFR BLD AUTO: 78.2 %
NONHDLC SERPL-MCNC: 103 MG/DL (ref ?–130)
NOROVIRUS GI/GII PCR: NEGATIVE
OSMOLALITY SERPL CALC.SUM OF ELEC: 289 MOSM/KG (ref 275–295)
P SHIGELLOIDES DNA STL QL NAA+PROBE: NEGATIVE
PATIENT FASTING: NO
PATIENT FASTING: NO
PLATELET # BLD AUTO: 279 10(3)UL (ref 150–450)
POTASSIUM SERPL-SCNC: 3.8 MMOL/L (ref 3.5–5.1)
RBC # BLD AUTO: 4.18 X10(6)UL (ref 3.8–5.3)
ROTAVIRUS A PCR: NEGATIVE
SALMONELLA DNA SPEC QL NAA+PROBE: NEGATIVE
SAPOVIRUS PCR: NEGATIVE
SHIGELLA SP+EIEC IPAH ST NAA+NON-PROBE: NEGATIVE
SODIUM SERPL-SCNC: 140 MMOL/L (ref 136–145)
TRIGL SERPL-MCNC: 85 MG/DL (ref 30–149)
V CHOLERAE DNA SPEC QL NAA+PROBE: NEGATIVE
VIBRIO DNA SPEC NAA+PROBE: NEGATIVE
VLDLC SERPL CALC-MCNC: 17 MG/DL (ref 0–30)
WBC # BLD AUTO: 15.3 X10(3) UL (ref 4–11)
YERSINIA DNA SPEC NAA+PROBE: NEGATIVE

## 2019-08-28 PROCEDURE — 80061 LIPID PANEL: CPT

## 2019-08-28 PROCEDURE — 36415 COLL VENOUS BLD VENIPUNCTURE: CPT

## 2019-08-28 PROCEDURE — 85025 COMPLETE CBC W/AUTO DIFF WBC: CPT

## 2019-08-28 PROCEDURE — 99214 OFFICE O/P EST MOD 30 MIN: CPT | Performed by: INTERNAL MEDICINE

## 2019-08-28 PROCEDURE — 80048 BASIC METABOLIC PNL TOTAL CA: CPT

## 2019-08-28 PROCEDURE — G0463 HOSPITAL OUTPT CLINIC VISIT: HCPCS | Performed by: INTERNAL MEDICINE

## 2019-08-28 PROCEDURE — 87507 IADNA-DNA/RNA PROBE TQ 12-25: CPT

## 2019-08-28 RX ORDER — CIPROFLOXACIN 250 MG/1
250 TABLET, FILM COATED ORAL 2 TIMES DAILY
Qty: 20 TABLET | Refills: 0 | Status: SHIPPED | OUTPATIENT
Start: 2019-08-28 | End: 2019-08-29 | Stop reason: ALTCHOICE

## 2019-08-28 NOTE — PATIENT INSTRUCTIONS
1.  Patient is to push fluids including Gatorade, 7-Up, ginger ale, and cola drinks. 2.  I will obtain lab tests which will include a CBC, BMP and a GI panel. 3.  Patient may use Imodium right ear 2 tablets every 4-6 hours as necessary for diarrhea.   4.

## 2019-08-28 NOTE — PROGRESS NOTES
Guanaco Jacobs is a 75year old female. Patient presents with:  Nausea/Vomiting/Diarrhea (gastrointestinal): x 4 days  Diarrhea    HPI:   Patient presents with:  Nausea/Vomiting/Diarrhea (gastrointestinal): x 4 days  Diarrhea    Patient presents today wit NextGen   • Osteoporosis screening 05-    per NextGen   • Other ill-defined conditions(799.89) 2008    per NextGen:  \"bulging discs; management:  surgery\"   • Rosacea     Management:  tetracycline p.r.n.       Social History:  Social History    Tob will also start the patient on Cipro 250 mg orally twice daily for 10 days. Patient to call me back in 2 days to let me know how she is doing.   She will call me sooner she has more problems per    The patient indicates understanding of these issues and ag

## 2019-08-29 ENCOUNTER — TELEPHONE (OUTPATIENT)
Dept: INTERNAL MEDICINE CLINIC | Facility: CLINIC | Age: 64
End: 2019-08-29

## 2019-08-29 RX ORDER — VANCOMYCIN HYDROCHLORIDE 250 MG/1
250 CAPSULE ORAL 4 TIMES DAILY
Qty: 40 CAPSULE | Refills: 0 | Status: SHIPPED | OUTPATIENT
Start: 2019-08-29 | End: 2019-09-08

## 2019-08-30 NOTE — TELEPHONE ENCOUNTER
Kamryn 88 phone call to patient. Patient was seen yesterday with a 4 to 5-day history of rather severe diarrhea. Patient had a GI panel performed yesterday which is shown her to have an infection with C. difficile. Patient has been placed on Cipro.   I have c

## 2019-11-04 ENCOUNTER — TELEPHONE (OUTPATIENT)
Dept: INTERNAL MEDICINE CLINIC | Facility: CLINIC | Age: 64
End: 2019-11-04

## 2019-11-04 ENCOUNTER — OFFICE VISIT (OUTPATIENT)
Dept: INTERNAL MEDICINE CLINIC | Facility: CLINIC | Age: 64
End: 2019-11-04
Payer: MEDICARE

## 2019-11-04 VITALS
TEMPERATURE: 99 F | DIASTOLIC BLOOD PRESSURE: 78 MMHG | SYSTOLIC BLOOD PRESSURE: 124 MMHG | WEIGHT: 144.19 LBS | HEART RATE: 79 BPM | BODY MASS INDEX: 26.53 KG/M2 | OXYGEN SATURATION: 95 % | HEIGHT: 62 IN

## 2019-11-04 DIAGNOSIS — J02.9 PHARYNGITIS, UNSPECIFIED ETIOLOGY: Primary | ICD-10-CM

## 2019-11-04 PROCEDURE — 87880 STREP A ASSAY W/OPTIC: CPT | Performed by: INTERNAL MEDICINE

## 2019-11-04 PROCEDURE — G0463 HOSPITAL OUTPT CLINIC VISIT: HCPCS | Performed by: INTERNAL MEDICINE

## 2019-11-04 PROCEDURE — 99214 OFFICE O/P EST MOD 30 MIN: CPT | Performed by: INTERNAL MEDICINE

## 2019-11-04 RX ORDER — AZITHROMYCIN 250 MG/1
TABLET, FILM COATED ORAL
Qty: 6 TABLET | Refills: 1 | Status: SHIPPED | OUTPATIENT
Start: 2019-11-04 | End: 2021-04-08 | Stop reason: ALTCHOICE

## 2019-11-04 NOTE — TELEPHONE ENCOUNTER
Per Dr. Tommy Sanchez, patient can be added on to 12:30 today. I spoke with patient and she is agreeable to the appointment. Appointment scheduled.

## 2019-11-04 NOTE — TELEPHONE ENCOUNTER
Pt has sore throat with white spots   Wants to be sure she doesn't have strep   Cannot make Dr Turner Sample  11:30   Needs appt after 12:15   And before 3 when she picks up her grandson    Please call pt to advise [phone]

## 2019-11-04 NOTE — TELEPHONE ENCOUNTER
Please advise - called patient who has sore throat with white spots for over 1 week, denies fever, cannot make it at 11:30 , needs judith after 12:15 pm and 3 pm - no judith available- to DR. KAPLAN

## 2019-11-04 NOTE — PROGRESS NOTES
Denis Gardner is a 75year old female. Patient presents with:  Sore Throat: Sore throat, fever, sinus congestion, cough for 10 days. Had root canal 2.5 weeks ago, took amoxicillin. Taking ibuprofen and tylenol now. Did not have a flu shot.      HPI:   Vernon Garcia History:  Social History    Tobacco Use      Smoking status: Never Smoker      Smokeless tobacco: Never Used    Alcohol use: Yes      Alcohol/week: 0.0 standard drinks      Comment: (beer & wine) 1 glass weekly.   (Please verify, because per [employer]:  (beer asked to return in as scheduled or as necessary. Shawanda Sharp MD  11/4/2019  1:00 PM

## 2019-11-04 NOTE — PATIENT INSTRUCTIONS
1.  Patient is to continue her current diet, medications and activity. 2.  I will place the patient on Zithromax that she can take 2 tablets today and 1 tablet every morning for the next 4 days. I will give her 1 refill to use as necessary.   3.  Patient

## 2019-11-05 ENCOUNTER — TELEPHONE (OUTPATIENT)
Dept: INTERNAL MEDICINE CLINIC | Facility: CLINIC | Age: 64
End: 2019-11-05

## 2019-11-06 NOTE — TELEPHONE ENCOUNTER
Please call pt and notify her that her recent Throat C+S was negative for strep. OK to finish course of antibiotics. I will route this to nursing.   Thank you!!

## 2020-01-13 ENCOUNTER — HOSPITAL ENCOUNTER (OUTPATIENT)
Age: 65
Discharge: HOME | End: 2020-01-13
Attending: EMERGENCY MEDICINE
Payer: MEDICARE

## 2020-01-13 ENCOUNTER — APPOINTMENT (OUTPATIENT)
Dept: GENERAL RADIOLOGY | Age: 65
End: 2020-01-13
Attending: EMERGENCY MEDICINE
Payer: MEDICARE

## 2020-01-13 VITALS
WEIGHT: 140 LBS | SYSTOLIC BLOOD PRESSURE: 131 MMHG | BODY MASS INDEX: 25.76 KG/M2 | TEMPERATURE: 100 F | HEART RATE: 107 BPM | RESPIRATION RATE: 20 BRPM | OXYGEN SATURATION: 96 % | HEIGHT: 62 IN | DIASTOLIC BLOOD PRESSURE: 75 MMHG

## 2020-01-13 DIAGNOSIS — H65.90 NON-SUPPURATIVE OTITIS MEDIA, UNSPECIFIED LATERALITY: ICD-10-CM

## 2020-01-13 DIAGNOSIS — J06.9 UPPER RESPIRATORY TRACT INFECTION, UNSPECIFIED TYPE: Primary | ICD-10-CM

## 2020-01-13 PROCEDURE — 71046 X-RAY EXAM CHEST 2 VIEWS: CPT | Performed by: EMERGENCY MEDICINE

## 2020-01-13 PROCEDURE — 99214 OFFICE O/P EST MOD 30 MIN: CPT

## 2020-01-13 PROCEDURE — 99213 OFFICE O/P EST LOW 20 MIN: CPT

## 2020-01-13 RX ORDER — ALBUTEROL SULFATE 90 UG/1
2 AEROSOL, METERED RESPIRATORY (INHALATION) EVERY 4 HOURS PRN
Qty: 1 INHALER | Refills: 0 | Status: SHIPPED | OUTPATIENT
Start: 2020-01-13 | End: 2020-02-12

## 2020-01-13 RX ORDER — AMOXICILLIN 875 MG/1
875 TABLET, COATED ORAL 2 TIMES DAILY
Qty: 20 TABLET | Refills: 0 | Status: SHIPPED | OUTPATIENT
Start: 2020-01-13 | End: 2020-01-23

## 2020-01-14 NOTE — ED PROVIDER NOTES
Patient Seen in: [facility]      History   Patient presents with:  Cough    Stated Complaint: COUGH    HPI    Patient is a 39-year-old female with no significant past medical history was been sick for about 5 days.   She s noted in HPI. Constitutional and vital signs reviewed. All other systems reviewed and negative except as noted above.     Physical Exam     ED Triage Vitals [01/13/20 1909]   /75   Pulse 107   Resp 20   Temp 100.1 °F (37.8 °C)   Temp src Oral 1/13/2020 at 19:44                  MDM     Findings discussed with patient. Will treat cough and bronchitis with her Ventolin inhaler will treat ear infection with amoxicillin encouraged outpatient follow-up for resolution of symptoms.               Dispo

## 2020-02-06 ENCOUNTER — OFFICE VISIT (OUTPATIENT)
Dept: INTERNAL MEDICINE CLINIC | Facility: CLINIC | Age: 65
End: 2020-02-06
Payer: MEDICARE

## 2020-02-06 ENCOUNTER — LAB ENCOUNTER (OUTPATIENT)
Dept: LAB | Age: 65
End: 2020-02-06
Attending: INTERNAL MEDICINE
Payer: MEDICARE

## 2020-02-06 VITALS
WEIGHT: 142.19 LBS | HEIGHT: 62 IN | SYSTOLIC BLOOD PRESSURE: 126 MMHG | OXYGEN SATURATION: 98 % | DIASTOLIC BLOOD PRESSURE: 80 MMHG | HEART RATE: 76 BPM | BODY MASS INDEX: 26.17 KG/M2 | TEMPERATURE: 99 F

## 2020-02-06 DIAGNOSIS — E55.9 VITAMIN D DEFICIENCY: ICD-10-CM

## 2020-02-06 DIAGNOSIS — E78.1 HYPERTRIGLYCERIDEMIA: ICD-10-CM

## 2020-02-06 DIAGNOSIS — Z00.00 ANNUAL PHYSICAL EXAM: ICD-10-CM

## 2020-02-06 DIAGNOSIS — E78.9 BORDERLINE HIGH CHOLESTEROL: ICD-10-CM

## 2020-02-06 DIAGNOSIS — R53.83 FATIGUE, UNSPECIFIED TYPE: ICD-10-CM

## 2020-02-06 DIAGNOSIS — M47.812 OSTEOARTHRITIS OF CERVICAL SPINE, UNSPECIFIED SPINAL OSTEOARTHRITIS COMPLICATION STATUS: ICD-10-CM

## 2020-02-06 DIAGNOSIS — M15.9 PRIMARY OSTEOARTHRITIS INVOLVING MULTIPLE JOINTS: Primary | ICD-10-CM

## 2020-02-06 DIAGNOSIS — L71.9 ROSACEA: ICD-10-CM

## 2020-02-06 LAB
ALBUMIN SERPL-MCNC: 4 G/DL (ref 3.4–5)
ALBUMIN/GLOB SERPL: 1 {RATIO} (ref 1–2)
ALP LIVER SERPL-CCNC: 66 U/L (ref 55–142)
ALT SERPL-CCNC: 31 U/L (ref 13–56)
ANION GAP SERPL CALC-SCNC: 7 MMOL/L (ref 0–18)
AST SERPL-CCNC: 19 U/L (ref 15–37)
BASOPHILS # BLD AUTO: 0.03 X10(3) UL (ref 0–0.2)
BASOPHILS NFR BLD AUTO: 0.5 %
BILIRUB SERPL-MCNC: 0.5 MG/DL (ref 0.1–2)
BILIRUB UR QL: NEGATIVE
BUN BLD-MCNC: 13 MG/DL (ref 7–18)
BUN/CREAT SERPL: 20 (ref 10–20)
CALCIUM BLD-MCNC: 9 MG/DL (ref 8.5–10.1)
CHLORIDE SERPL-SCNC: 107 MMOL/L (ref 98–112)
CHOLEST SMN-MCNC: 188 MG/DL (ref ?–200)
CLARITY UR: CLEAR
CO2 SERPL-SCNC: 27 MMOL/L (ref 21–32)
COLOR UR: YELLOW
CREAT BLD-MCNC: 0.65 MG/DL (ref 0.55–1.02)
DEPRECATED RDW RBC AUTO: 45.2 FL (ref 35.1–46.3)
EOSINOPHIL # BLD AUTO: 0.17 X10(3) UL (ref 0–0.7)
EOSINOPHIL NFR BLD AUTO: 2.7 %
ERYTHROCYTE [DISTWIDTH] IN BLOOD BY AUTOMATED COUNT: 14 % (ref 11–15)
GLOBULIN PLAS-MCNC: 4.2 G/DL (ref 2.8–4.4)
GLUCOSE BLD-MCNC: 87 MG/DL (ref 70–99)
GLUCOSE UR-MCNC: NEGATIVE MG/DL
HCT VFR BLD AUTO: 41.4 % (ref 35–48)
HDLC SERPL-MCNC: 44 MG/DL (ref 40–59)
HGB BLD-MCNC: 13.2 G/DL (ref 12–16)
HGB UR QL STRIP.AUTO: NEGATIVE
IMM GRANULOCYTES # BLD AUTO: 0.01 X10(3) UL (ref 0–1)
IMM GRANULOCYTES NFR BLD: 0.2 %
KETONES UR-MCNC: NEGATIVE MG/DL
LDLC SERPL CALC-MCNC: 107 MG/DL (ref ?–100)
LEUKOCYTE ESTERASE UR QL STRIP.AUTO: NEGATIVE
LYMPHOCYTES # BLD AUTO: 1.81 X10(3) UL (ref 1–4)
LYMPHOCYTES NFR BLD AUTO: 28.9 %
M PROTEIN MFR SERPL ELPH: 8.2 G/DL (ref 6.4–8.2)
MCH RBC QN AUTO: 28.6 PG (ref 26–34)
MCHC RBC AUTO-ENTMCNC: 31.9 G/DL (ref 31–37)
MCV RBC AUTO: 89.8 FL (ref 80–100)
MONOCYTES # BLD AUTO: 0.49 X10(3) UL (ref 0.1–1)
MONOCYTES NFR BLD AUTO: 7.8 %
NEUTROPHILS # BLD AUTO: 3.76 X10 (3) UL (ref 1.5–7.7)
NEUTROPHILS # BLD AUTO: 3.76 X10(3) UL (ref 1.5–7.7)
NEUTROPHILS NFR BLD AUTO: 59.9 %
NITRITE UR QL STRIP.AUTO: NEGATIVE
NONHDLC SERPL-MCNC: 144 MG/DL (ref ?–130)
OSMOLALITY SERPL CALC.SUM OF ELEC: 291 MOSM/KG (ref 275–295)
PATIENT FASTING Y/N/NP: YES
PATIENT FASTING Y/N/NP: YES
PH UR: 5 [PH] (ref 5–8)
PLATELET # BLD AUTO: 281 10(3)UL (ref 150–450)
POTASSIUM SERPL-SCNC: 3.7 MMOL/L (ref 3.5–5.1)
PROT UR-MCNC: NEGATIVE MG/DL
RBC # BLD AUTO: 4.61 X10(6)UL (ref 3.8–5.3)
SODIUM SERPL-SCNC: 141 MMOL/L (ref 136–145)
SP GR UR STRIP: 1.02 (ref 1–1.03)
TRIGL SERPL-MCNC: 185 MG/DL (ref 30–149)
TSI SER-ACNC: 1.85 MIU/ML (ref 0.36–3.74)
UROBILINOGEN UR STRIP-ACNC: <2
VLDLC SERPL CALC-MCNC: 37 MG/DL (ref 0–30)
WBC # BLD AUTO: 6.3 X10(3) UL (ref 4–11)

## 2020-02-06 PROCEDURE — G0009 ADMIN PNEUMOCOCCAL VACCINE: HCPCS | Performed by: INTERNAL MEDICINE

## 2020-02-06 PROCEDURE — 36415 COLL VENOUS BLD VENIPUNCTURE: CPT

## 2020-02-06 PROCEDURE — 80061 LIPID PANEL: CPT

## 2020-02-06 PROCEDURE — 81003 URINALYSIS AUTO W/O SCOPE: CPT | Performed by: INTERNAL MEDICINE

## 2020-02-06 PROCEDURE — 82306 VITAMIN D 25 HYDROXY: CPT

## 2020-02-06 PROCEDURE — G0008 ADMIN INFLUENZA VIRUS VAC: HCPCS | Performed by: INTERNAL MEDICINE

## 2020-02-06 PROCEDURE — G0463 HOSPITAL OUTPT CLINIC VISIT: HCPCS | Performed by: INTERNAL MEDICINE

## 2020-02-06 PROCEDURE — 90732 PPSV23 VACC 2 YRS+ SUBQ/IM: CPT | Performed by: INTERNAL MEDICINE

## 2020-02-06 PROCEDURE — 84443 ASSAY THYROID STIM HORMONE: CPT

## 2020-02-06 PROCEDURE — 85025 COMPLETE CBC W/AUTO DIFF WBC: CPT

## 2020-02-06 PROCEDURE — 90662 IIV NO PRSV INCREASED AG IM: CPT | Performed by: INTERNAL MEDICINE

## 2020-02-06 PROCEDURE — 80053 COMPREHEN METABOLIC PANEL: CPT

## 2020-02-06 PROCEDURE — 99214 OFFICE O/P EST MOD 30 MIN: CPT | Performed by: INTERNAL MEDICINE

## 2020-02-06 NOTE — PATIENT INSTRUCTIONS
1.  Patient is to continue her current diet, medication and activity. 2.  Patient will be given her flu vaccine and Pneumovax 23 today. 3.  I will place orders in the system for the patient have blood tests and urinalysis.   4.  I will plan to see the pat

## 2020-02-06 NOTE — PROGRESS NOTES
Latonia Banuelos is a 75year old female. Patient presents with:  Checkup    HPI:   Patient presents with:  Checkup    Patient feels well. She has no complaints today.   Over the past year she has been seen for episodes of upper respiratory infections and a liquor) 2 drinks weekly.)    Drug use: No       REVIEW OF SYSTEMS:   GENERAL HEALTH: feels well otherwise  RESPIRATORY:No cough or SOB  CARDIOVASCULAR: No chest pain  GI: No abdominal pain, nausea, vomiting, diarrhea, or constipation  :No Urinary complai system for her we will see the patient back sooner as necessary. The patient indicates understanding of these issues and agrees to the plan. The patient is asked to return in 1 year or sooner as necessary. Vern Castro MD  2/6/2020  9:24 AM

## 2020-02-10 LAB — 25(OH)D3 SERPL-MCNC: 27.4 NG/ML (ref 30–100)

## 2020-02-14 ENCOUNTER — TELEPHONE (OUTPATIENT)
Dept: INTERNAL MEDICINE CLINIC | Facility: CLINIC | Age: 65
End: 2020-02-14

## 2020-02-14 RX ORDER — VITAMIN B COMPLEX
1000 TABLET ORAL DAILY
Qty: 100 TABLET | Refills: 3 | Status: SHIPPED | OUTPATIENT
Start: 2020-02-14 | End: 2021-04-08

## 2020-02-14 NOTE — TELEPHONE ENCOUNTER
Telephone call to patient and message left. Patient's vitamin D level was slightly low at 27. Patient's triglycerides are slightly high at 185. Patient is to watch her carbs and sweets. Patient is to take vitamin D 1000 units orally daily.   I I will pl

## 2020-06-08 ENCOUNTER — OFFICE VISIT (OUTPATIENT)
Dept: OBGYN CLINIC | Facility: CLINIC | Age: 65
End: 2020-06-08
Payer: MEDICARE

## 2020-06-08 VITALS
HEIGHT: 62 IN | BODY MASS INDEX: 25.73 KG/M2 | SYSTOLIC BLOOD PRESSURE: 123 MMHG | WEIGHT: 139.81 LBS | DIASTOLIC BLOOD PRESSURE: 78 MMHG | HEART RATE: 77 BPM

## 2020-06-08 DIAGNOSIS — Z01.419 ENCOUNTER FOR GYNECOLOGICAL EXAMINATION WITHOUT ABNORMAL FINDING: Primary | ICD-10-CM

## 2020-06-08 DIAGNOSIS — Z12.31 ENCOUNTER FOR SCREENING MAMMOGRAM FOR BREAST CANCER: ICD-10-CM

## 2020-06-08 PROCEDURE — G0101 CA SCREEN;PELVIC/BREAST EXAM: HCPCS | Performed by: OBSTETRICS & GYNECOLOGY

## 2020-06-08 RX ORDER — AMITRIPTYLINE HYDROCHLORIDE 25 MG/1
25 TABLET, FILM COATED ORAL NIGHTLY
COMMUNITY
Start: 2014-07-24 | End: 2021-04-08

## 2020-06-08 RX ORDER — ACETAMINOPHEN 325 MG/1
325 TABLET ORAL
COMMUNITY
End: 2021-04-08

## 2020-06-08 NOTE — PROGRESS NOTES
Well Woman Exam    HPI:  The patient is a 68yo female who presents today for annual exam. She has no complaints. She was a patient of Dr. Elaine Martinez who recently retired. She denies abnormal vaginal discharge. She denies any vaginal bleeding.      Reviewed medical Medical: Not on file        Non-medical: Not on file    Tobacco Use      Smoking status: Never Smoker      Smokeless tobacco: Never Used    Substance and Sexual Activity      Alcohol use:  Yes        Alcohol/week: 0.0 standard drinks        Comment: (beer & • Lipids Father    • Stroke Father         CVA (Stroke)   • Diabetes Father    • Dementia Mother 59   • Other (Other) Brother         Autoimmune disease of lungs       MEDICATIONS:    Current Outpatient Medications:   •  Amitriptyline HCl 25 MG Oral Tab, rhythm   Lungs: clear to ascultation bilaterally   Lymphatic:no abnormal supraclavicular or axillary adenopathy is noted  Breast: normal without palpable masses, tenderness, asymmetry, nipple discharge, nipple retraction or skin changes  Abdomen:  soft, no

## 2020-07-07 ENCOUNTER — HOSPITAL ENCOUNTER (OUTPATIENT)
Dept: MAMMOGRAPHY | Facility: HOSPITAL | Age: 65
Discharge: HOME | End: 2020-07-07
Attending: OBSTETRICS & GYNECOLOGY
Payer: MEDICARE

## 2020-07-07 DIAGNOSIS — Z12.31 ENCOUNTER FOR SCREENING MAMMOGRAM FOR BREAST CANCER: ICD-10-CM

## 2020-07-07 PROCEDURE — 77067 SCR MAMMO BI INCL CAD: CPT | Performed by: OBSTETRICS & GYNECOLOGY

## 2020-07-07 PROCEDURE — 77063 BREAST TOMOSYNTHESIS BI: CPT | Performed by: OBSTETRICS & GYNECOLOGY

## 2021-04-08 ENCOUNTER — OFFICE VISIT (OUTPATIENT)
Dept: INTERNAL MEDICINE CLINIC | Facility: CLINIC | Age: 66
End: 2021-04-08
Payer: MEDICARE

## 2021-04-08 VITALS
HEIGHT: 62 IN | OXYGEN SATURATION: 99 % | DIASTOLIC BLOOD PRESSURE: 76 MMHG | WEIGHT: 142 LBS | BODY MASS INDEX: 26.13 KG/M2 | HEART RATE: 76 BPM | SYSTOLIC BLOOD PRESSURE: 120 MMHG

## 2021-04-08 DIAGNOSIS — Z00.00 ANNUAL PHYSICAL EXAM: Primary | ICD-10-CM

## 2021-04-08 DIAGNOSIS — E78.9 BORDERLINE HIGH CHOLESTEROL: ICD-10-CM

## 2021-04-08 DIAGNOSIS — L71.9 ROSACEA: ICD-10-CM

## 2021-04-08 DIAGNOSIS — R53.83 FATIGUE, UNSPECIFIED TYPE: ICD-10-CM

## 2021-04-08 DIAGNOSIS — E78.1 HYPERTRIGLYCERIDEMIA: ICD-10-CM

## 2021-04-08 DIAGNOSIS — Z12.31 VISIT FOR SCREENING MAMMOGRAM: ICD-10-CM

## 2021-04-08 DIAGNOSIS — M47.812 OSTEOARTHRITIS OF CERVICAL SPINE, UNSPECIFIED SPINAL OSTEOARTHRITIS COMPLICATION STATUS: ICD-10-CM

## 2021-04-08 DIAGNOSIS — E55.9 VITAMIN D DEFICIENCY: ICD-10-CM

## 2021-04-08 DIAGNOSIS — Z78.0 ASYMPTOMATIC MENOPAUSAL STATE: ICD-10-CM

## 2021-04-08 DIAGNOSIS — M15.9 PRIMARY OSTEOARTHRITIS INVOLVING MULTIPLE JOINTS: ICD-10-CM

## 2021-04-08 PROCEDURE — G0439 PPPS, SUBSEQ VISIT: HCPCS | Performed by: INTERNAL MEDICINE

## 2021-04-08 PROCEDURE — 99212 OFFICE O/P EST SF 10 MIN: CPT | Performed by: INTERNAL MEDICINE

## 2021-04-08 PROCEDURE — 93000 ELECTROCARDIOGRAM COMPLETE: CPT | Performed by: INTERNAL MEDICINE

## 2021-04-08 NOTE — PATIENT INSTRUCTIONS
1.  Patient is to continue her current diet, medication and activity. 2.  Patient has had her 2 Covid vaccines. 3.  I will give the patient an order to obtain blood tests and urinalysis as ordered.   4.  I will give the patient order to have her screening

## 2021-04-08 NOTE — PROGRESS NOTES
HPI:   Don Reeves is a 74year old female who was seen by me on April 8, 2021 for her Medicare annual physical examination. At the time of the examination Mrs. Matt Guillen was feeling well/okay. She does feel tired at times.   Patient has received her 4/24/2018    Performed by Kavon Calabrese MD at [address] [place] [address] MAIN OR   • SPRING LOCALIZATION WIRE 1 SITE RIGHT (UPU=[id number])     • MUSCULOSKELETAL SURGERY UNLISTED  2008    (due to \"bulging discs\")   • ORAL SURGERY     • OTHER SURGICAL HISTORY  2008    Cervical noted.  LUNGS: clear to auscultation  CARDIO: RRR, normal S1S2, no murmurs  GI: Protuberant, BS are present, no masses or organomegaly  MUSCULOSKELETAL: back is not tender, no pain or swelling in her legs  EXTREMITIES: no edema, all pulses are intact  NEUR osteoarthritis involving multiple joints  Stable. CPM.    3. Osteoarthritis of cervical spine, unspecified spinal osteoarthritis complication status  Patient continues to have some pain in her neck.   She feels she is going to have this because this to her without trying?: 2 - No    Has your appetite been poor?: No    Type of Diet: Balanced    How does the patient maintain a good energy level?: Appropriate Exercise    How would you describe your daily physical activity?: Light    How would you describe your Screening    Screening Method: Questionnaire, Whisper Test  Whisper Test Result: Pass         Visual Acuity                   Cognitive Assessment     What day of the week is this?: Correct    What month is it?: Correct    What year is it?: Correct    Reca Screening Mammogram      Mammogram  Annually Mammogram due on 07/07/2021 Update Health Maintenance if applicable   Immunizations      Influenza No orders found for this or any previous visit.  Update Immunization Activity if applicable    Pneumococcal Lamount Antes

## 2021-04-15 ENCOUNTER — OFFICE VISIT (OUTPATIENT)
Dept: INTERNAL MEDICINE CLINIC | Facility: CLINIC | Age: 66
End: 2021-04-15
Payer: MEDICARE

## 2021-04-15 ENCOUNTER — TELEPHONE (OUTPATIENT)
Dept: INTERNAL MEDICINE CLINIC | Facility: CLINIC | Age: 66
End: 2021-04-15

## 2021-04-15 VITALS
BODY MASS INDEX: 26.13 KG/M2 | SYSTOLIC BLOOD PRESSURE: 132 MMHG | DIASTOLIC BLOOD PRESSURE: 80 MMHG | HEIGHT: 62 IN | TEMPERATURE: 99 F | HEART RATE: 80 BPM | WEIGHT: 142 LBS

## 2021-04-15 DIAGNOSIS — B02.9 HERPES ZOSTER WITHOUT COMPLICATION: Primary | ICD-10-CM

## 2021-04-15 PROCEDURE — 99214 OFFICE O/P EST MOD 30 MIN: CPT | Performed by: INTERNAL MEDICINE

## 2021-04-15 RX ORDER — TRAMADOL HYDROCHLORIDE 50 MG/1
50 TABLET ORAL EVERY 6 HOURS PRN
Qty: 20 TABLET | Refills: 0 | Status: SHIPPED | OUTPATIENT
Start: 2021-04-15 | End: 2021-04-22

## 2021-04-15 RX ORDER — VALACYCLOVIR HYDROCHLORIDE 1 G/1
1 TABLET, FILM COATED ORAL 3 TIMES DAILY
Qty: 21 TABLET | Refills: 0 | Status: SHIPPED | OUTPATIENT
Start: 2021-04-15 | End: 2021-04-22

## 2021-04-15 NOTE — TELEPHONE ENCOUNTER
Patient is calling with possible shingles. Patient states she was in the office about a week ago for bothersome issues on her back.  Patient states since than her itching, burning, and some blisters have gotten worse on her back on the left side, and comes

## 2021-04-15 NOTE — PROGRESS NOTES
Darci Aguirre is a 74year old female. Patient presents with:  Checkup: possible shingles  Shingles    HPI:   Patient presents with:  Checkup: possible shingles  Shingles    Patient complains of pain in her left upper back with some radiation to the left 132/80   Pulse 80   Temp 98.9 °F (37.2 °C) (Oral)   Ht 5' 2\" (1.575 m)   Wt 142 lb (64.4 kg)   BMI 25.97 kg/m²   GENERAL: well developed, well nourished in no acute distress  HEENT: normal oropharynx.  Ears are normal. Eyes are normal  NECK: supple,no lymp

## 2021-04-15 NOTE — TELEPHONE ENCOUNTER
Noted. Please call patient and ask her to come in to see me today 11:30. I will route this to nursing into .   Thank you!!

## 2021-04-15 NOTE — PATIENT INSTRUCTIONS
1.  Patient is to continue her current diet, medication and activity. 2.  I will place the patient on Valtrex 1 g orally 3 times a day for 7 days.   3.  We will give the patient a prescription for tramadol 50 mg which she can take every 6 hours as necessar

## 2021-04-22 ENCOUNTER — TELEPHONE (OUTPATIENT)
Dept: INTERNAL MEDICINE CLINIC | Facility: CLINIC | Age: 66
End: 2021-04-22

## 2021-04-22 ENCOUNTER — LAB ENCOUNTER (OUTPATIENT)
Dept: LAB | Facility: HOSPITAL | Age: 66
End: 2021-04-22
Attending: INTERNAL MEDICINE
Payer: MEDICARE

## 2021-04-22 DIAGNOSIS — E78.1 HYPERTRIGLYCERIDEMIA: ICD-10-CM

## 2021-04-22 DIAGNOSIS — E78.00 HYPERCHOLESTEROLEMIA: Primary | ICD-10-CM

## 2021-04-22 DIAGNOSIS — Z00.00 ANNUAL PHYSICAL EXAM: ICD-10-CM

## 2021-04-22 DIAGNOSIS — R53.83 FATIGUE, UNSPECIFIED TYPE: ICD-10-CM

## 2021-04-22 DIAGNOSIS — E55.9 VITAMIN D DEFICIENCY: ICD-10-CM

## 2021-04-22 PROCEDURE — 81001 URINALYSIS AUTO W/SCOPE: CPT | Performed by: INTERNAL MEDICINE

## 2021-04-22 PROCEDURE — 80053 COMPREHEN METABOLIC PANEL: CPT

## 2021-04-22 PROCEDURE — 80061 LIPID PANEL: CPT

## 2021-04-22 PROCEDURE — 82306 VITAMIN D 25 HYDROXY: CPT

## 2021-04-22 PROCEDURE — 85025 COMPLETE CBC W/AUTO DIFF WBC: CPT

## 2021-04-22 PROCEDURE — 84443 ASSAY THYROID STIM HORMONE: CPT

## 2021-04-22 PROCEDURE — 36415 COLL VENOUS BLD VENIPUNCTURE: CPT

## 2021-04-22 NOTE — TELEPHONE ENCOUNTER
Telephone call to patient and lab results discussed. For the most part her lab tests have turned out well. Her CBC, CMP and TSH and urinalysis turned out well.   Patient's cholesterol readings were slightly elevated but her triglycerides are elevated at 2

## 2021-04-23 ENCOUNTER — TELEPHONE (OUTPATIENT)
Dept: INTERNAL MEDICINE CLINIC | Facility: CLINIC | Age: 66
End: 2021-04-23

## 2021-04-23 NOTE — TELEPHONE ENCOUNTER
Please notify patient that her vitamin B12 level has come back low. It is 28. It should be greater than 30. At the time of her last visit earlier this month I did recommend to the patient that she should be taking vitamin D 1000 units orally daily.   Ple

## 2021-04-26 NOTE — TELEPHONE ENCOUNTER
I spoke with patient and relayed Dr. Twin Champion message regarding her Vitamin D. She verbalized understanding.

## 2021-05-10 ENCOUNTER — PATIENT MESSAGE (OUTPATIENT)
Dept: INTERNAL MEDICINE CLINIC | Facility: CLINIC | Age: 66
End: 2021-05-10

## 2021-05-10 ENCOUNTER — TELEPHONE (OUTPATIENT)
Dept: INTERNAL MEDICINE CLINIC | Facility: CLINIC | Age: 66
End: 2021-05-10

## 2021-05-10 NOTE — TELEPHONE ENCOUNTER
From: Teja Ryan  To: Katja Rodrigues MD  Sent: 5/10/2021 3:29 PM CDT  Subject: Non-Urgent Medical Question    I had shingles starting about 4/8/21 and the blisters healed about 2 weeks after that .  The pain around the middle of my left side has n

## 2021-05-10 NOTE — TELEPHONE ENCOUNTER
----- Message from NORTH HILL L. Bosworth sent at 5/10/2021  3:29 PM CDT -----  Regarding: Non-Urgent Medical Question  Contact: [phone]  I had shingles starting about 4/8/21 and the blisters  healed about 2 weeks after that .   The pain around the middle o

## 2021-05-11 NOTE — TELEPHONE ENCOUNTER
Telephone call to patient and situation discussed. I told the patient I am not aware of another ointment or stronger lidocaine ointment that can be used for the pain.   I did tell the patient we oftentimes use hydrocodone/Norco or Tylenol 3 for the pain bu

## 2021-10-05 ENCOUNTER — HOSPITAL ENCOUNTER (OUTPATIENT)
Dept: BONE DENSITY | Facility: HOSPITAL | Age: 66
Discharge: HOME | End: 2021-10-05
Attending: INTERNAL MEDICINE
Payer: MEDICARE

## 2021-10-05 ENCOUNTER — HOSPITAL ENCOUNTER (OUTPATIENT)
Dept: MAMMOGRAPHY | Facility: HOSPITAL | Age: 66
Discharge: HOME | End: 2021-10-05
Attending: INTERNAL MEDICINE
Payer: MEDICARE

## 2021-10-05 DIAGNOSIS — Z12.31 VISIT FOR SCREENING MAMMOGRAM: ICD-10-CM

## 2021-10-05 DIAGNOSIS — Z78.0 ASYMPTOMATIC MENOPAUSAL STATE: ICD-10-CM

## 2021-10-05 LAB — HM DEXA SCAN: NORMAL

## 2021-10-05 PROCEDURE — 77080 DXA BONE DENSITY AXIAL: CPT | Performed by: INTERNAL MEDICINE

## 2021-10-05 PROCEDURE — 77063 BREAST TOMOSYNTHESIS BI: CPT | Performed by: INTERNAL MEDICINE

## 2021-10-05 PROCEDURE — 77067 SCR MAMMO BI INCL CAD: CPT | Performed by: INTERNAL MEDICINE

## 2021-10-12 ENCOUNTER — TELEPHONE (OUTPATIENT)
Dept: INTERNAL MEDICINE CLINIC | Facility: CLINIC | Age: 66
End: 2021-10-12

## 2021-10-13 NOTE — TELEPHONE ENCOUNTER
Please notify pt that her recent mammogram has turned out well. No sign of cancer noted. I will route this to nursing.   Thank you!!

## 2021-10-29 ENCOUNTER — TELEPHONE (OUTPATIENT)
Dept: INTERNAL MEDICINE CLINIC | Facility: CLINIC | Age: 66
End: 2021-10-29

## 2021-10-29 ENCOUNTER — HOSPITAL ENCOUNTER (EMERGENCY)
Age: 66
Discharge: HOME | End: 2021-10-29

## 2021-10-29 ENCOUNTER — APPOINTMENT (OUTPATIENT)
Dept: CT IMAGING | Age: 66
End: 2021-10-29
Attending: PHYSICIAN ASSISTANT

## 2021-10-29 VITALS
WEIGHT: 140 LBS | BODY MASS INDEX: 25.76 KG/M2 | HEART RATE: 95 BPM | SYSTOLIC BLOOD PRESSURE: 137 MMHG | TEMPERATURE: 98.5 F | RESPIRATION RATE: 18 BRPM | HEIGHT: 62 IN | DIASTOLIC BLOOD PRESSURE: 71 MMHG | OXYGEN SATURATION: 96 %

## 2021-10-29 DIAGNOSIS — K57.32 SIGMOID DIVERTICULITIS: Primary | ICD-10-CM

## 2021-10-29 LAB
ALBUMIN SERPL-MCNC: 3.5 G/DL (ref 3.6–5.1)
ALBUMIN/GLOB SERPL: 0.7 {RATIO} (ref 1–2.4)
ALP SERPL-CCNC: 70 UNITS/L (ref 45–117)
ALT SERPL-CCNC: 31 UNITS/L
ANION GAP SERPL CALC-SCNC: 15 MMOL/L (ref 10–20)
APPEARANCE UR: CLEAR
AST SERPL-CCNC: 21 UNITS/L
BACTERIA #/AREA URNS HPF: ABNORMAL /HPF
BASOPHILS # BLD: 0 K/MCL (ref 0–0.3)
BASOPHILS NFR BLD: 0 %
BILIRUB SERPL-MCNC: 0.4 MG/DL (ref 0.2–1)
BILIRUB UR QL STRIP: NEGATIVE
BUN SERPL-MCNC: 15 MG/DL (ref 6–20)
BUN/CREAT SERPL: 26 (ref 7–25)
CALCIUM SERPL-MCNC: 8.7 MG/DL (ref 8.4–10.2)
CHLORIDE SERPL-SCNC: 102 MMOL/L (ref 98–107)
CO2 SERPL-SCNC: 25 MMOL/L (ref 21–32)
COLOR UR: YELLOW
CREAT SERPL-MCNC: 0.57 MG/DL (ref 0.51–0.95)
DEPRECATED RDW RBC: 44.6 FL (ref 39–50)
EOSINOPHIL # BLD: 0.1 K/MCL (ref 0–0.5)
EOSINOPHIL NFR BLD: 1 %
ERYTHROCYTE [DISTWIDTH] IN BLOOD: 13.7 % (ref 11–15)
FASTING DURATION TIME PATIENT: ABNORMAL H
GFR SERPLBLD BASED ON 1.73 SQ M-ARVRAT: >90 ML/MIN
GLOBULIN SER-MCNC: 4.7 G/DL (ref 2–4)
GLUCOSE SERPL-MCNC: 94 MG/DL (ref 70–99)
GLUCOSE UR STRIP-MCNC: NEGATIVE MG/DL
HCT VFR BLD CALC: 38.6 % (ref 36–46.5)
HGB BLD-MCNC: 12.7 G/DL (ref 12–15.5)
HGB UR QL STRIP: ABNORMAL
HYALINE CASTS #/AREA URNS LPF: ABNORMAL /LPF
IMM GRANULOCYTES # BLD AUTO: 0.1 K/MCL (ref 0–0.2)
IMM GRANULOCYTES # BLD: 0 %
KETONES UR STRIP-MCNC: 40 MG/DL
LACTATE BLDV-SCNC: 0.7 MMOL/L (ref 0–2)
LEUKOCYTE ESTERASE UR QL STRIP: NEGATIVE
LIPASE SERPL-CCNC: 113 UNITS/L (ref 73–393)
LYMPHOCYTES # BLD: 1.8 K/MCL (ref 1–4)
LYMPHOCYTES NFR BLD: 10 %
MCH RBC QN AUTO: 29 PG (ref 26–34)
MCHC RBC AUTO-ENTMCNC: 32.9 G/DL (ref 32–36.5)
MCV RBC AUTO: 88.1 FL (ref 78–100)
MONOCYTES # BLD: 0.8 K/MCL (ref 0.3–0.9)
MONOCYTES NFR BLD: 5 %
MUCOUS THREADS URNS QL MICRO: PRESENT
NEUTROPHILS # BLD: 14.5 K/MCL (ref 1.8–7.7)
NEUTROPHILS NFR BLD: 84 %
NITRITE UR QL STRIP: NEGATIVE
NRBC BLD MANUAL-RTO: 0 /100 WBC
PH UR STRIP: 6 [PH] (ref 5–7)
PLATELET # BLD AUTO: 300 K/MCL (ref 140–450)
POTASSIUM SERPL-SCNC: 3.5 MMOL/L (ref 3.4–5.1)
PROT SERPL-MCNC: 8.2 G/DL (ref 6.4–8.2)
PROT UR STRIP-MCNC: ABNORMAL MG/DL
RBC # BLD: 4.38 MIL/MCL (ref 4–5.2)
RBC #/AREA URNS HPF: ABNORMAL /HPF
SODIUM SERPL-SCNC: 138 MMOL/L (ref 135–145)
SP GR UR STRIP: >=1.03 (ref 1–1.03)
SQUAMOUS #/AREA URNS HPF: ABNORMAL /HPF
UROBILINOGEN UR STRIP-MCNC: 0.2 MG/DL
WBC # BLD: 17.2 K/MCL (ref 4.2–11)
WBC #/AREA URNS HPF: ABNORMAL /HPF

## 2021-10-29 PROCEDURE — 83605 ASSAY OF LACTIC ACID: CPT | Performed by: PHYSICIAN ASSISTANT

## 2021-10-29 PROCEDURE — G1004 CDSM NDSC: HCPCS | Performed by: RADIOLOGY

## 2021-10-29 PROCEDURE — 74177 CT ABD & PELVIS W/CONTRAST: CPT

## 2021-10-29 PROCEDURE — 99285 EMERGENCY DEPT VISIT HI MDM: CPT | Performed by: PHYSICIAN ASSISTANT

## 2021-10-29 PROCEDURE — 10002807 HB RX 258: Performed by: RADIOLOGY

## 2021-10-29 PROCEDURE — 99284 EMERGENCY DEPT VISIT MOD MDM: CPT

## 2021-10-29 PROCEDURE — 10002807 HB RX 258: Performed by: PHYSICIAN ASSISTANT

## 2021-10-29 PROCEDURE — 80053 COMPREHEN METABOLIC PANEL: CPT | Performed by: PHYSICIAN ASSISTANT

## 2021-10-29 PROCEDURE — 81001 URINALYSIS AUTO W/SCOPE: CPT | Performed by: PHYSICIAN ASSISTANT

## 2021-10-29 PROCEDURE — 10002805 HB CONTRAST AGENT: Performed by: RADIOLOGY

## 2021-10-29 PROCEDURE — G1004 CDSM NDSC: HCPCS

## 2021-10-29 PROCEDURE — 74177 CT ABD & PELVIS W/CONTRAST: CPT | Performed by: RADIOLOGY

## 2021-10-29 PROCEDURE — 96360 HYDRATION IV INFUSION INIT: CPT

## 2021-10-29 PROCEDURE — 85025 COMPLETE CBC W/AUTO DIFF WBC: CPT | Performed by: PHYSICIAN ASSISTANT

## 2021-10-29 PROCEDURE — 36415 COLL VENOUS BLD VENIPUNCTURE: CPT

## 2021-10-29 PROCEDURE — 83690 ASSAY OF LIPASE: CPT | Performed by: PHYSICIAN ASSISTANT

## 2021-10-29 RX ORDER — METRONIDAZOLE 500 MG/1
500 TABLET ORAL ONCE
Status: DISCONTINUED | OUTPATIENT
Start: 2021-10-29 | End: 2021-10-29 | Stop reason: HOSPADM

## 2021-10-29 RX ORDER — AMOXICILLIN AND CLAVULANATE POTASSIUM 875; 125 MG/1; MG/1
1 TABLET, FILM COATED ORAL ONCE
Status: DISCONTINUED | OUTPATIENT
Start: 2021-10-29 | End: 2021-10-29 | Stop reason: HOSPADM

## 2021-10-29 RX ORDER — AMOXICILLIN AND CLAVULANATE POTASSIUM 875; 125 MG/1; MG/1
1 TABLET, FILM COATED ORAL EVERY 12 HOURS
Qty: 20 TABLET | Refills: 0 | Status: SHIPPED | OUTPATIENT
Start: 2021-10-29 | End: 2021-11-08

## 2021-10-29 RX ORDER — METRONIDAZOLE 500 MG/1
500 TABLET ORAL 2 TIMES DAILY
Qty: 20 TABLET | Refills: 0 | Status: SHIPPED | OUTPATIENT
Start: 2021-10-29 | End: 2021-11-08

## 2021-10-29 RX ORDER — 0.9 % SODIUM CHLORIDE 0.9 %
2 VIAL (ML) INJECTION EVERY 12 HOURS SCHEDULED
Status: DISCONTINUED | OUTPATIENT
Start: 2021-10-29 | End: 2021-10-29 | Stop reason: HOSPADM

## 2021-10-29 RX ADMIN — SODIUM CHLORIDE 1000 ML: 9 INJECTION, SOLUTION INTRAVENOUS at 15:55

## 2021-10-29 RX ADMIN — IOHEXOL 100 ML: 300 INJECTION, SOLUTION INTRAVENOUS at 17:09

## 2021-10-29 RX ADMIN — SODIUM CHLORIDE 100 ML: 9 INJECTION, SOLUTION INTRAVENOUS at 17:09

## 2021-10-29 ASSESSMENT — ENCOUNTER SYMPTOMS
SHORTNESS OF BREATH: 0
CHILLS: 1
VOMITING: 0
EYES NEGATIVE: 1
COLOR CHANGE: 0
TROUBLE SWALLOWING: 0
NAUSEA: 0
FEVER: 0
ABDOMINAL PAIN: 1
DIARRHEA: 0
APPETITE CHANGE: 0
DIZZINESS: 0
WOUND: 0
SORE THROAT: 0
WHEEZING: 0
FACIAL SWELLING: 0
COUGH: 0
CHOKING: 0
SPEECH DIFFICULTY: 0
WEAKNESS: 0
DIAPHORESIS: 0
CONSTIPATION: 0

## 2021-10-29 ASSESSMENT — PAIN SCALES - GENERAL: PAINLEVEL_OUTOF10: 1

## 2021-10-29 NOTE — TELEPHONE ENCOUNTER
FYI to Dr. Greer Martinez----    Spoke to pt who reports 8/10 abdominal pain that has been worsening since 10/24. States pain is in lower R/L quadrants and radiates to her rectum when she is sitting on the toilet. No BM since 10/27 AM (normal BM at that time).  Reports

## 2021-10-29 NOTE — TELEPHONE ENCOUNTER
Patient is calling on Avila 10/24 patient developed abdominal cramping  The last 2 days the cramping is getting worse  When she wipes she has mucus from her rectum  He hasn't had a bowel movement since Wednesday 10/27    Patient is out of town she doesn't

## 2021-11-01 RX ORDER — AMOXICILLIN AND CLAVULANATE POTASSIUM 875; 125 MG/1; MG/1
1 TABLET, FILM COATED ORAL EVERY 12 HOURS
COMMUNITY
Start: 2021-10-29 | End: 2021-11-08

## 2021-11-01 RX ORDER — METRONIDAZOLE 500 MG/1
500 TABLET ORAL 2 TIMES DAILY
COMMUNITY
Start: 2021-10-29 | End: 2021-11-08

## 2021-11-01 NOTE — TELEPHONE ENCOUNTER
LIAMI to Dr. Rosaura Acevedo and Dr. Felipe Arana---    Spoke to patient, reports she is taking augmentin 875-125mg BID for 10 days and metronidazole 500mg BID for 10 days. Pt unable to make it for Dr. Nitish anton tomorrow due to having another appt.  Scheduled with Dr. Nohelia Dooley

## 2021-11-01 NOTE — TELEPHONE ENCOUNTER
I spoke with patient for ER follow up from Friday 10/29/21. She did go to the ER and was evaluated. She has diverticulitis and she is on antibiotics now. She is home but not sure of the names of the medications at this time.  Invited her to call back with a

## 2021-11-02 ENCOUNTER — OFFICE VISIT (OUTPATIENT)
Dept: INTERNAL MEDICINE CLINIC | Facility: CLINIC | Age: 66
End: 2021-11-02
Payer: MEDICARE

## 2021-11-02 VITALS
WEIGHT: 146 LBS | BODY MASS INDEX: 26.87 KG/M2 | OXYGEN SATURATION: 98 % | SYSTOLIC BLOOD PRESSURE: 146 MMHG | TEMPERATURE: 98 F | HEART RATE: 78 BPM | HEIGHT: 62 IN | DIASTOLIC BLOOD PRESSURE: 92 MMHG

## 2021-11-02 DIAGNOSIS — Z12.11 SCREENING FOR COLON CANCER: ICD-10-CM

## 2021-11-02 DIAGNOSIS — K57.92 ACUTE DIVERTICULITIS: Primary | ICD-10-CM

## 2021-11-02 PROCEDURE — 99213 OFFICE O/P EST LOW 20 MIN: CPT | Performed by: INTERNAL MEDICINE

## 2021-11-02 NOTE — PROGRESS NOTES
Chief Complaint:   Patient presents with:  ER F/U: Pt here for er follow up. States her pain is no longer present. Last BM this morning. Meds exacerbating hearburn.  Taking tums PRN    HPI:     Ms. Coronel Racenemesio is a 74year old female PMHX osteoarthritis, hyper \"bulging discs\")   • ORAL SURGERY     • OTHER SURGICAL HISTORY  2008    Cervical discectomy     Social History:  Social History    Tobacco Use      Smoking status: Never Smoker      Smokeless tobacco: Never Used    Vaping Use      Vaping Use: Never used Joint Swelling, Joint Stiffness, Back Pain, Neck Pain  Integumentary: Skin Lesions, Pruritis, Hair Changes, Jaundice, Nail changes  Neuro: Weakness, Numbness, Paresthesias, Loss of Consciousness, Syncope, Dizziness, Headache, Falls  Psych:  Anxiety, Depress Non- 95 >=60    GFR, -American 110 >=60    ALT 41 13 - 56 U/L    AST 19 15 - 37 U/L    Alkaline Phosphatase 55 55 - 142 U/L    Bilirubin, Total 0.4 0.1 - 2.0 mg/dL    Total Protein 8.0 6.4 - 8.2 g/dL    Albumin 3.6 3.4 - 5.0 g/dL atelectasis    Liver/Spleen/Biliary System: Normal.    Pancreas/Adrenals: Normal.    Kidneys/Urinary Bladder: Normal.    Retroperitoneum: No significant aortic disease. No aneurysm. No adenopathy  based on size criteria. Bowel:  The stomach is of normal Prilosec on an as-needed basis first.  If persistent, then can use on a daily basis. Orders This Visit:  No orders of the defined types were placed in this encounter.       Meds This Visit:  Requested Prescriptions      No prescriptions requested or o

## 2021-11-02 NOTE — PATIENT INSTRUCTIONS
You are seen in clinic for post ER follow-up in which we reviewed your blood tests and CT scan. There is concern for acute diverticulitis which is an infection of the intestines that we treat with antibiotics.     We are so happy to hear that you are feeli

## 2021-11-05 ENCOUNTER — PATIENT MESSAGE (OUTPATIENT)
Dept: INTERNAL MEDICINE CLINIC | Facility: CLINIC | Age: 66
End: 2021-11-05

## 2021-11-05 ENCOUNTER — TELEPHONE (OUTPATIENT)
Dept: INTERNAL MEDICINE CLINIC | Facility: CLINIC | Age: 66
End: 2021-11-05

## 2021-11-05 NOTE — TELEPHONE ENCOUNTER
----- Message from NORTH HILL L. Bosworth sent at 11/5/2021 12:40 PM CDT -----  Regarding: Colonoscopy  Dr. Jose Stoll,  I was diagnosed with diverticulitis at the ER in [place] on 10/29. I have been on antibiotics since then.   I made an appointment with Baldo Sullivan

## 2021-11-05 NOTE — TELEPHONE ENCOUNTER
From: Patsy Parsons  To: Li North MD  Sent: 11/5/2021 12:40 PM CDT  Subject: Colonoscopy    Dr. Mahogany Hook, I was diagnosed with diverticulitis at the ER in [place] on 10/29. I have been on antibiotics since then.  I made an appointment with SAMM

## 2021-12-14 NOTE — H&P
[address] [facility] [address] Gastroenterology                                                                                                  Clinic History and Physical     Pa Chronic Right Cervical Radiculopathy   • Fracture of right ankle     casting   • Ganglion cyst of volar aspect of left wrist 2018   •  2 para 2     2 vaginal deliveries   • Lipid screening 2011    per [employer]   • Osteoporosis screening 0 OTHER (SEE COMMENTS)    Comment:emesis  Norco [Hydrocodone-*        Comment:emesis  Vicodin [Hydrocodon*        Comment:emesis    ROS:   CONSTITUTIONAL:  negative for fevers, rigors  EYES:  negative for diplopia   RESPIRATORY:  negative for sever diverticulitis      1. History of diverticulitis/lower abdominal pain: Physical exam as above. The patient has a history of diverticulitis and feels that her symptoms which began Sunday are similar.   While her pain is not severe on physical exam and has continue all medications as prescribed    ** COVID-19 testing required 72 hours prior to procedure        Colonoscopy consent: I have discussed the risks (including risk of delayed/missed diagnosis), benefits, and alternatives to colonoscopy with the patie

## 2021-12-27 ENCOUNTER — PATIENT MESSAGE (OUTPATIENT)
Dept: GASTROENTEROLOGY | Facility: CLINIC | Age: 66
End: 2021-12-27

## 2021-12-27 NOTE — TELEPHONE ENCOUNTER
Patient contacted. She has never been seen in our office-aware we cannot triage symptoms or prescribe medications until she establishes care with our office per policy.     I advised in the meantime to go to ED if symptoms severe or if mild symptoms to r

## 2021-12-27 NOTE — TELEPHONE ENCOUNTER
From: Anne Agudelo  To: ELLYN Gusman  Sent: 12/27/2021 8:44 AM CST  Subject: My Appointment Tomorrow    Krystian Wallace please call me concerning my appointment tomorrow.  I have diverticulitis again and have some questions about managing the pain and if

## 2021-12-28 ENCOUNTER — TELEPHONE (OUTPATIENT)
Dept: GASTROENTEROLOGY | Facility: CLINIC | Age: 66
End: 2021-12-28

## 2021-12-28 ENCOUNTER — OFFICE VISIT (OUTPATIENT)
Dept: GASTROENTEROLOGY | Facility: CLINIC | Age: 66
End: 2021-12-28
Payer: MEDICARE

## 2021-12-28 VITALS
SYSTOLIC BLOOD PRESSURE: 113 MMHG | DIASTOLIC BLOOD PRESSURE: 71 MMHG | BODY MASS INDEX: 26.13 KG/M2 | HEART RATE: 103 BPM | HEIGHT: 62 IN | WEIGHT: 142 LBS

## 2021-12-28 DIAGNOSIS — K21.9 GASTROESOPHAGEAL REFLUX DISEASE, UNSPECIFIED WHETHER ESOPHAGITIS PRESENT: ICD-10-CM

## 2021-12-28 DIAGNOSIS — Z87.19 HISTORY OF DIVERTICULITIS: Primary | ICD-10-CM

## 2021-12-28 PROCEDURE — 99204 OFFICE O/P NEW MOD 45 MIN: CPT | Performed by: NURSE PRACTITIONER

## 2021-12-28 RX ORDER — CIPROFLOXACIN 500 MG/1
500 TABLET, FILM COATED ORAL 2 TIMES DAILY
Qty: 14 TABLET | Refills: 0 | Status: SHIPPED | OUTPATIENT
Start: 2021-12-28 | End: 2021-12-29 | Stop reason: ALTCHOICE

## 2021-12-28 RX ORDER — METRONIDAZOLE 500 MG/1
500 TABLET ORAL 3 TIMES DAILY
Qty: 21 TABLET | Refills: 0 | Status: SHIPPED | OUTPATIENT
Start: 2021-12-28 | End: 2022-01-04

## 2021-12-28 RX ORDER — POLYETHYLENE GLYCOL 3350, SODIUM CHLORIDE, SODIUM BICARBONATE, POTASSIUM CHLORIDE 420; 11.2; 5.72; 1.48 G/4L; G/4L; G/4L; G/4L
POWDER, FOR SOLUTION ORAL
Qty: 4000 ML | Refills: 0 | Status: SHIPPED | OUTPATIENT
Start: 2021-12-28

## 2021-12-28 NOTE — PATIENT INSTRUCTIONS
-Schedule colonoscopy w/ Dr. Twyla Carrillo or Dr. Adrienne Biswas with IV twilight or MAC  Dx: hx diverticulitis, lower abd pain  -Eligible for NE: Yes r/t BMI <40  -Prep: Split dose Colyte/TriLyte or equivalent  -Anti-platelets and anti-coagulants: None   -Diabetes

## 2021-12-28 NOTE — TELEPHONE ENCOUNTER
Scheduled for:  Colonoscopy 35718  Provider Name:  Dr Lyndon Huston  Date:  02/22/2022  Location:  [facility]  Sedation:  MAC  Time: 0830 (pt is aware to arrive at 0730)      Prep:  Colyte  Meds/Allergies Reconciled?:  Physician reviewed  Diagnosis with codes:  Hx of di

## 2021-12-29 ENCOUNTER — TELEPHONE (OUTPATIENT)
Dept: GASTROENTEROLOGY | Facility: CLINIC | Age: 66
End: 2021-12-29

## 2021-12-29 RX ORDER — LEVOFLOXACIN 750 MG/1
750 TABLET ORAL DAILY
Qty: 7 TABLET | Refills: 0 | Status: SHIPPED | OUTPATIENT
Start: 2021-12-29 | End: 2022-01-05

## 2021-12-29 NOTE — TELEPHONE ENCOUNTER
Martin Cummins    I called to do the prior authorization. Per the pharmacist at [facility] the problem is not authorization, they asked for an alternative because Ciprofloxacin is on backorder. Please advise on alternate drug.     Thank you

## 2021-12-29 NOTE — TELEPHONE ENCOUNTER
Alternative requested  Alternative requested: Not Covered    Current Outpatient Medications   Medication Sig Dispense Refill   • ciprofloxacin 500 MG Oral Tab Take 1 tablet (500 mg total) by mouth 2 (two) times daily for 7 days.  Take every 12 hours 14 tabl

## 2021-12-29 NOTE — TELEPHONE ENCOUNTER
pt. states that she provided the wrong insur and that the correct current insur which is good through 12/31/2021 is Medicare [address] [id number] and that the Melvin Rey will be effective 1/1/2022.

## 2022-02-15 LAB — AMB EXT COVID-19 RESULT: DETECTED

## 2022-02-16 ENCOUNTER — TELEPHONE (OUTPATIENT)
Dept: INTERNAL MEDICINE CLINIC | Facility: CLINIC | Age: 67
End: 2022-02-16

## 2022-02-16 ENCOUNTER — TELEPHONE (OUTPATIENT)
Dept: GASTROENTEROLOGY | Facility: CLINIC | Age: 67
End: 2022-02-16

## 2022-02-16 NOTE — TELEPHONE ENCOUNTER
Patient indicates she tested positive for covid received results last night, symptoms started on Friday 2/11. Patient needs to reschedule cln on 2/22 if required, please call at [phone],EMIIUD.   *informed of 5 business day turnaround

## 2022-02-16 NOTE — TELEPHONE ENCOUNTER
Patient is calling on 2/11 she developed a temp, chills, headache, achy. Symptoms lasted 24 hours. Patient is now experiencing sneezing, clogged head, headache. She feels it is more cold symptoms. Patient received her Positive Covid results on 2/15/22. Does Dr Mykel Love recommend she do anything? Please call [phone].     Tasked to nursing high

## 2022-02-16 NOTE — TELEPHONE ENCOUNTER
To DR. KAPLAN - called patient who was testes Sunday for covid  And got results of positive covid 2/15/22. She now has sneezing , headache on and off , she also wants to know how long she should quarantine. Rn instructed to monitor for high fever ( she states fever resolved) now only cold symptoms

## 2022-02-16 NOTE — TELEPHONE ENCOUNTER
Patient states she is asymptomatic, per Endo I will R/S her 4 weeks from 2/15/22. Rescheduled for:  Colonoscopy 20640  Provider Name:  Dr. Brenna Clayton  Date:    From-2/22/22 To-4/4/22  Location:    [address] [place] [address]  Sedation:  MAC  Time:    From-0830 To-1430 (pt is aware to arrive at 1330)   Prep:  [place], sent new instructions via [employer] on 2/16/22  Meds/Allergies Reconciled?:  Physician reviewed   Diagnosis with codes:  History of diverticulitis Z87.19, GERD K21.9  Was patient informed to call insurance with codes (Y/N):  Yes, I confirmed Medicare/BCBS insurance with the patient. Referral sent?:  Referral authorized (no PA required) from-2/22/22 to-12/8/22  [facility] or [address] notified?:  I sent a revised electronic request to Endo Scheduling and received a confirmation today. Medication Orders: This patient verbally confirmed that she is not taking:   Iron, blood thinners, BP meds, and is not diabetic   Not latex allergy, Not PCN allergy and does not have a pacemaker     Misc Orders:       Further instructions given by staff:    This patient had no questions regarding the prep instructions

## 2022-02-17 NOTE — TELEPHONE ENCOUNTER
Phone call to patient. Patient developed a fever on Friday. She ran a fever for a couple of days and the fever improved on Sunday. Patient was tested for Covid on Sunday and came back positive on Tuesday. Today is Wednesday 1 day later. Patient feels better she feels like she has a cold. The fever that she had is improved. She does have some slight head congestion and cough but feels that she is getting better. I have told her that she needs to quarantine for 5 days which she can do from the starting on Sunday which means she should quarantine until Friday after which time if she does go out she should wear a mask. Patient has had her first 2 COVID vaccines but did not get the booster. I have encouraged her to get the booster vaccine after she gets over this acute illness. She should wait maybe a week or 2 after she gets over the illness to get the booster vaccine. She has had the TruQu vaccine previously she get Flypeeps vaccine either at a local pharmacy or at the [facility]. Patient is currently in [facility] and will most likely get the vaccine up there.

## 2022-03-15 ENCOUNTER — TELEPHONE (OUTPATIENT)
Dept: GASTROENTEROLOGY | Facility: CLINIC | Age: 67
End: 2022-03-15

## 2022-03-15 NOTE — TELEPHONE ENCOUNTER
I spoke with Agueda at [facility] regarding CPT code for upcoming colonoscopy. CTP code given 68217. States no PA needed for procedure.   Ref #[id number]

## 2022-03-25 NOTE — PAT NURSING NOTE
Pt tested positive for Covid on 02/11/22. Pt stated she had a fever, cough and runny nose. Pt is scheduled more than 6 weeks out from positive date. Ok to proceed with scheduled colonoscopy on 04/04/22 per anesthesia guidelines. No additional covid testing is required.

## 2022-04-04 ENCOUNTER — ANESTHESIA (OUTPATIENT)
Dept: ENDOSCOPY | Age: 67
End: 2022-04-04
Payer: MEDICARE

## 2022-04-04 ENCOUNTER — HOSPITAL ENCOUNTER (OUTPATIENT)
Age: 67
Setting detail: HOSPITAL OUTPATIENT SURGERY
Discharge: HOME | End: 2022-04-04
Attending: INTERNAL MEDICINE | Admitting: INTERNAL MEDICINE
Payer: MEDICARE

## 2022-04-04 ENCOUNTER — ANESTHESIA EVENT (OUTPATIENT)
Dept: ENDOSCOPY | Age: 67
End: 2022-04-04
Payer: MEDICARE

## 2022-04-04 VITALS
HEART RATE: 81 BPM | DIASTOLIC BLOOD PRESSURE: 88 MMHG | RESPIRATION RATE: 15 BRPM | OXYGEN SATURATION: 98 % | SYSTOLIC BLOOD PRESSURE: 126 MMHG | BODY MASS INDEX: 25.03 KG/M2 | HEIGHT: 62 IN | WEIGHT: 136 LBS

## 2022-04-04 DIAGNOSIS — K21.9 GASTROESOPHAGEAL REFLUX DISEASE, UNSPECIFIED WHETHER ESOPHAGITIS PRESENT: ICD-10-CM

## 2022-04-04 DIAGNOSIS — Z87.19 HISTORY OF DIVERTICULITIS: ICD-10-CM

## 2022-04-04 LAB — COLONOSCOPY STUDY: NORMAL

## 2022-04-04 PROCEDURE — 88305 TISSUE EXAM BY PATHOLOGIST: CPT | Performed by: INTERNAL MEDICINE

## 2022-04-04 PROCEDURE — 45380 COLONOSCOPY AND BIOPSY: CPT | Performed by: INTERNAL MEDICINE

## 2022-04-04 PROCEDURE — 99070 SPECIAL SUPPLIES PHYS/QHP: CPT | Performed by: INTERNAL MEDICINE

## 2022-04-04 RX ORDER — SODIUM CHLORIDE, SODIUM LACTATE, POTASSIUM CHLORIDE, CALCIUM CHLORIDE 600; 310; 30; 20 MG/100ML; MG/100ML; MG/100ML; MG/100ML
INJECTION, SOLUTION INTRAVENOUS CONTINUOUS
Status: DISCONTINUED | OUTPATIENT
Start: 2022-04-04 | End: 2022-04-04

## 2022-04-04 RX ORDER — LIDOCAINE HYDROCHLORIDE 10 MG/ML
INJECTION, SOLUTION EPIDURAL; INFILTRATION; INTRACAUDAL; PERINEURAL AS NEEDED
Status: DISCONTINUED | OUTPATIENT
Start: 2022-04-04 | End: 2022-04-04 | Stop reason: SURG

## 2022-04-04 RX ADMIN — LIDOCAINE HYDROCHLORIDE 50 MG: 10 INJECTION, SOLUTION EPIDURAL; INFILTRATION; INTRACAUDAL; PERINEURAL at 15:23:00

## 2022-04-04 NOTE — ANESTHESIA POSTPROCEDURE EVALUATION
Patient: Kandis Perez    Procedure Summary     Date: 04/04/22 Room / Location: [facility] ENDOSCOPY 01 / [facility] ENDO    Anesthesia Start: 1926 Anesthesia Stop: 2101    Procedure: COLONOSCOPY (N/A ) Diagnosis:       History of diverticulitis      Gastroesophageal reflux disease, unspecified whether esophagitis present      (polyp,diverticulosis, hemorrhoids)    Surgeons: Jessi Antony MD Anesthesiologist:     Anesthesia Type: MAC ASA Status: 2          Anesthesia Type: MAC    Vitals Value Taken Time   /57 04/04/22 1548   Temp  04/04/22 1552   Pulse 87 04/04/22 1551   Resp 19 04/04/22 1551   SpO2 98 % 04/04/22 1551   Vitals shown include unvalidated device data.     [address] AN Post Evaluation:   Patient Evaluated in PACU  Patient Participation: complete - patient participated  Level of Consciousness: awake  Pain Management: adequate  Airway Patency:patent  Yes    Cardiovascular Status: acceptable  Respiratory Status: acceptable  Postoperative Hydration acceptable      Marianna Savage MD  4/4/2022 3:52 PM

## 2022-04-04 NOTE — OPERATIVE REPORT
[facility] Endoscopy Report      Preoperative Diagnosis:  - colon cancer screening  - history of diverticulitis       Postoperative Diagnosis:  - colon polyp x 1  - diverticular disease  - internal hemorrhoids      Procedure:    Colonoscopy       Surgeon:  Tom Osman M.D. Anesthesia:  MAC sedation     Technique:  After informed consent, the patient was placed in the left lateral recumbent position. Digital rectal examination revealed no palpable intraluminal abnormalities. An Olympus variable stiffness pediatric 190 series HD colonoscope was inserted into the rectum and advanced under direct vision by following the lumen to the cecum. The colon was examined upon withdrawal in the left lateral position. The procedure was well tolerated without immediate complication. Findings:  The preparation of the colon was good. The terminal ileum was examined for 4 cm and visually normal.  The ileocecal valve was well preserved. The visualized colonic mucosa from the cecum to the anal verge was normal with an intact vascular pattern. Colon polyp x1, this was diminutive and repeat removed by cold forceps technique. No bleeding was noted the polypectomy site and specimen was sent for analysis. Extensive diverticular disease predominantly in the sigmoid colon with widemouth complex diverticula extending to the descending colon. No evidence of diverticulitis. Internal hemorrhoids noted on retroflexed view, small in size. Estimated blood loss-insignificant  Specimens-colon polyp    Impression:  - colon polyp x 1  - diverticular disease  - internal hemorrhoids      Recommendations:  - Post polypectomy instructions given  - Repeat colonoscopy in 7- 10 years  - High fiber diet for diverticular disease  - Symptomatic treatment of hemorrhoids          Mariana Barroso MD  4/4/2022  4:04 PM

## 2022-04-05 ENCOUNTER — TELEPHONE (OUTPATIENT)
Dept: GASTROENTEROLOGY | Facility: CLINIC | Age: 67
End: 2022-04-05

## 2022-04-05 NOTE — TELEPHONE ENCOUNTER
----- Message from Taryn Oconnor MD sent at 4/5/2022  1:21 PM CDT -----  I wanted to get back to you with your colonoscopy results. You had one colon polyp removed which was benign. I would advise a repeat colonoscopy in 7 years to make sure no new polyps are forming. You also have internal hemorrhoids and diverticulosis. Please stay on a high fiber diet and call with any questions.

## 2022-04-06 NOTE — TELEPHONE ENCOUNTER
Left message for patient to call back. Health Maintenance Updated. 7 year colonoscopy recall entered into patient outreach in [place]. Next colonoscopy will be due 4/4/2029.

## 2022-05-13 ENCOUNTER — OFFICE VISIT (OUTPATIENT)
Dept: INTERNAL MEDICINE CLINIC | Facility: CLINIC | Age: 67
End: 2022-05-13
Payer: MEDICARE

## 2022-05-13 VITALS
HEIGHT: 62 IN | WEIGHT: 137.81 LBS | DIASTOLIC BLOOD PRESSURE: 74 MMHG | SYSTOLIC BLOOD PRESSURE: 130 MMHG | RESPIRATION RATE: 16 BRPM | BODY MASS INDEX: 25.36 KG/M2 | OXYGEN SATURATION: 98 % | HEART RATE: 74 BPM | TEMPERATURE: 98 F

## 2022-05-13 DIAGNOSIS — Z86.16 HISTORY OF COVID-19: ICD-10-CM

## 2022-05-13 DIAGNOSIS — Z86.19 HISTORY OF SHINGLES: ICD-10-CM

## 2022-05-13 DIAGNOSIS — L71.9 ROSACEA: ICD-10-CM

## 2022-05-13 DIAGNOSIS — E78.1 HYPERTRIGLYCERIDEMIA: ICD-10-CM

## 2022-05-13 DIAGNOSIS — B02.29 POST HERPETIC NEURALGIA: ICD-10-CM

## 2022-05-13 DIAGNOSIS — K57.30 DIVERTICULOSIS OF COLON: ICD-10-CM

## 2022-05-13 DIAGNOSIS — E55.9 VITAMIN D DEFICIENCY: ICD-10-CM

## 2022-05-13 DIAGNOSIS — E78.00 HYPERCHOLESTEROLEMIA: ICD-10-CM

## 2022-05-13 DIAGNOSIS — M47.812 OSTEOARTHRITIS OF CERVICAL SPINE, UNSPECIFIED SPINAL OSTEOARTHRITIS COMPLICATION STATUS: ICD-10-CM

## 2022-05-13 DIAGNOSIS — Z87.19 HISTORY OF DIVERTICULITIS OF COLON: ICD-10-CM

## 2022-05-13 DIAGNOSIS — R53.83 FATIGUE, UNSPECIFIED TYPE: ICD-10-CM

## 2022-05-13 DIAGNOSIS — Z00.00 ANNUAL PHYSICAL EXAM: Primary | ICD-10-CM

## 2022-05-13 DIAGNOSIS — M15.9 PRIMARY OSTEOARTHRITIS INVOLVING MULTIPLE JOINTS: ICD-10-CM

## 2022-05-13 PROCEDURE — G0439 PPPS, SUBSEQ VISIT: HCPCS | Performed by: INTERNAL MEDICINE

## 2022-05-13 PROCEDURE — 3008F BODY MASS INDEX DOCD: CPT | Performed by: INTERNAL MEDICINE

## 2022-05-13 PROCEDURE — 93000 ELECTROCARDIOGRAM COMPLETE: CPT | Performed by: INTERNAL MEDICINE

## 2022-05-13 PROCEDURE — 96160 PT-FOCUSED HLTH RISK ASSMT: CPT | Performed by: INTERNAL MEDICINE

## 2022-05-13 PROCEDURE — 3075F SYST BP GE 130 - 139MM HG: CPT | Performed by: INTERNAL MEDICINE

## 2022-05-13 PROCEDURE — 3078F DIAST BP <80 MM HG: CPT | Performed by: INTERNAL MEDICINE

## 2022-05-13 PROCEDURE — 99397 PER PM REEVAL EST PAT 65+ YR: CPT | Performed by: INTERNAL MEDICINE

## 2022-05-13 NOTE — PATIENT INSTRUCTIONS
1.  Patient is to continue her current diet, medications and activity. 2.  Patient is to follow high-fiber diet. If necessary she may add a fiber supplement such as Metamucil or Benefiber. 3.  I have encouraged patient to obtain her second COVID booster vaccine. She will call us with the dates of her first COVID booster vaccine. 4.  I will place orders in the system for the patient to have blood tests and urinalysis in the near future. After she has these test performed if she has not heard from within a week she should give me a call to discuss the results. 5.  I will refer the patient to see Dr. Hunter neurology evaluation concerning her head tremor and also her problem with balance when she looks to the right. 6.  I will tentatively plan to see the patient back in about 6 months.

## 2022-05-23 NOTE — TELEPHONE ENCOUNTER
Rx sent to pharmacy for [address], pt notified To Dr. Nguyen Chicas - see below. Few blisters - shooting pain from left back to left side, undable to sleep. Pain level: 9/10  Tylenol and Advil have helped some, benadryl doesn't really help. Pt's father is in assisted living on hospice now.   Should pt re

## 2022-05-26 ENCOUNTER — LAB ENCOUNTER (OUTPATIENT)
Dept: LAB | Age: 67
End: 2022-05-26
Attending: INTERNAL MEDICINE
Payer: MEDICARE

## 2022-05-26 DIAGNOSIS — R53.83 FATIGUE, UNSPECIFIED TYPE: ICD-10-CM

## 2022-05-26 DIAGNOSIS — E55.9 VITAMIN D DEFICIENCY: ICD-10-CM

## 2022-05-26 DIAGNOSIS — E78.00 HYPERCHOLESTEROLEMIA: ICD-10-CM

## 2022-05-26 DIAGNOSIS — Z00.00 ANNUAL PHYSICAL EXAM: ICD-10-CM

## 2022-05-26 DIAGNOSIS — E78.1 HYPERTRIGLYCERIDEMIA: ICD-10-CM

## 2022-05-26 LAB
ALBUMIN SERPL-MCNC: 3.9 G/DL (ref 3.4–5)
ALBUMIN/GLOB SERPL: 1 {RATIO} (ref 1–2)
ALP LIVER SERPL-CCNC: 58 U/L
ALT SERPL-CCNC: 29 U/L
ANION GAP SERPL CALC-SCNC: 6 MMOL/L (ref 0–18)
AST SERPL-CCNC: 21 U/L (ref 15–37)
BASOPHILS # BLD AUTO: 0.03 X10(3) UL (ref 0–0.2)
BASOPHILS NFR BLD AUTO: 0.4 %
BILIRUB SERPL-MCNC: 0.2 MG/DL (ref 0.1–2)
BILIRUB UR QL: NEGATIVE
BUN BLD-MCNC: 19 MG/DL (ref 7–18)
BUN/CREAT SERPL: 27.5 (ref 10–20)
CALCIUM BLD-MCNC: 9.2 MG/DL (ref 8.5–10.1)
CHLORIDE SERPL-SCNC: 107 MMOL/L (ref 98–112)
CHOLEST SERPL-MCNC: 193 MG/DL (ref ?–200)
CLARITY UR: CLEAR
CO2 SERPL-SCNC: 29 MMOL/L (ref 21–32)
COLOR UR: YELLOW
CREAT BLD-MCNC: 0.69 MG/DL
DEPRECATED RDW RBC AUTO: 49.3 FL (ref 35.1–46.3)
EOSINOPHIL # BLD AUTO: 0.19 X10(3) UL (ref 0–0.7)
EOSINOPHIL NFR BLD AUTO: 2.6 %
ERYTHROCYTE [DISTWIDTH] IN BLOOD BY AUTOMATED COUNT: 14.3 % (ref 11–15)
FASTING PATIENT LIPID ANSWER: YES
FASTING STATUS PATIENT QL REPORTED: YES
GLOBULIN PLAS-MCNC: 4.1 G/DL (ref 2.8–4.4)
GLUCOSE BLD-MCNC: 93 MG/DL (ref 70–99)
GLUCOSE UR-MCNC: NEGATIVE MG/DL
HCT VFR BLD AUTO: 44.9 %
HDLC SERPL-MCNC: 40 MG/DL (ref 40–59)
HGB BLD-MCNC: 13.8 G/DL
IMM GRANULOCYTES # BLD AUTO: 0.01 X10(3) UL (ref 0–1)
IMM GRANULOCYTES NFR BLD: 0.1 %
LDLC SERPL CALC-MCNC: 117 MG/DL (ref ?–100)
LYMPHOCYTES # BLD AUTO: 1.65 X10(3) UL (ref 1–4)
LYMPHOCYTES NFR BLD AUTO: 22.7 %
MCH RBC QN AUTO: 28.7 PG (ref 26–34)
MCHC RBC AUTO-ENTMCNC: 30.7 G/DL (ref 31–37)
MCV RBC AUTO: 93.3 FL
MONOCYTES # BLD AUTO: 0.43 X10(3) UL (ref 0.1–1)
MONOCYTES NFR BLD AUTO: 5.9 %
NEUTROPHILS # BLD AUTO: 4.95 X10 (3) UL (ref 1.5–7.7)
NEUTROPHILS # BLD AUTO: 4.95 X10(3) UL (ref 1.5–7.7)
NEUTROPHILS NFR BLD AUTO: 68.3 %
NITRITE UR QL STRIP.AUTO: NEGATIVE
NONHDLC SERPL-MCNC: 153 MG/DL (ref ?–130)
OSMOLALITY SERPL CALC.SUM OF ELEC: 296 MOSM/KG (ref 275–295)
PH UR: 5 [PH] (ref 5–8)
PLATELET # BLD AUTO: 275 10(3)UL (ref 150–450)
POTASSIUM SERPL-SCNC: 4.2 MMOL/L (ref 3.5–5.1)
PROT SERPL-MCNC: 8 G/DL (ref 6.4–8.2)
PROT UR-MCNC: NEGATIVE MG/DL
RBC # BLD AUTO: 4.81 X10(6)UL
SODIUM SERPL-SCNC: 142 MMOL/L (ref 136–145)
SP GR UR STRIP: 1.02 (ref 1–1.03)
TRIGL SERPL-MCNC: 207 MG/DL (ref 30–149)
TSI SER-ACNC: 2.15 MIU/ML (ref 0.36–3.74)
UROBILINOGEN UR STRIP-ACNC: <2
VIT C UR-MCNC: NEGATIVE MG/DL
VIT D+METAB SERPL-MCNC: 29.6 NG/ML (ref 30–100)
VLDLC SERPL CALC-MCNC: 36 MG/DL (ref 0–30)
WBC # BLD AUTO: 7.3 X10(3) UL (ref 4–11)

## 2022-05-26 PROCEDURE — 80061 LIPID PANEL: CPT

## 2022-05-26 PROCEDURE — 81001 URINALYSIS AUTO W/SCOPE: CPT | Performed by: INTERNAL MEDICINE

## 2022-05-26 PROCEDURE — 85025 COMPLETE CBC W/AUTO DIFF WBC: CPT

## 2022-05-26 PROCEDURE — 80053 COMPREHEN METABOLIC PANEL: CPT

## 2022-05-26 PROCEDURE — 82306 VITAMIN D 25 HYDROXY: CPT

## 2022-05-26 PROCEDURE — 36415 COLL VENOUS BLD VENIPUNCTURE: CPT

## 2022-05-26 PROCEDURE — 87086 URINE CULTURE/COLONY COUNT: CPT | Performed by: INTERNAL MEDICINE

## 2022-05-26 PROCEDURE — 84443 ASSAY THYROID STIM HORMONE: CPT

## 2022-05-27 ENCOUNTER — TELEPHONE (OUTPATIENT)
Dept: INTERNAL MEDICINE CLINIC | Facility: CLINIC | Age: 67
End: 2022-05-27

## 2022-05-27 DIAGNOSIS — E78.00 HYPERCHOLESTEROLEMIA: Primary | ICD-10-CM

## 2022-05-27 DIAGNOSIS — E55.9 VITAMIN D DEFICIENCY: ICD-10-CM

## 2022-05-27 NOTE — TELEPHONE ENCOUNTER
Phone call to patient to discuss recent lab results  Patient's lab results have turned out well. Patient's vitamin D level is slightly low at 29.6. Patient has just begun taking vitamin D 1000's orally daily. Patient CBC, CMP and TSH have all turned out well. Patient's lipid panel showed patient's cholesterol to be good the patient's triglycerides are elevated at 207 and LDL cholesterol was elevated 117. Patient will watch her diet more closely we will repeat a lipid panel and a vitamin D level on the patient in about 3 months I will have my nurses mail the patient a copy of American Heart Association diet. I will forward this message to nursing to mail the patient a copy of American Heart Association diet. I will place an order in the system for the patient's blood test that I recommended repeat in 3 months.

## 2022-06-28 ENCOUNTER — LAB ENCOUNTER (OUTPATIENT)
Dept: LAB | Facility: HOSPITAL | Age: 67
End: 2022-06-28
Attending: Other
Payer: MEDICARE

## 2022-06-28 ENCOUNTER — OFFICE VISIT (OUTPATIENT)
Dept: NEUROLOGY | Facility: CLINIC | Age: 67
End: 2022-06-28
Payer: MEDICARE

## 2022-06-28 VITALS
SYSTOLIC BLOOD PRESSURE: 122 MMHG | BODY MASS INDEX: 24.84 KG/M2 | DIASTOLIC BLOOD PRESSURE: 82 MMHG | WEIGHT: 135 LBS | HEIGHT: 62 IN

## 2022-06-28 DIAGNOSIS — R27.0 ATAXIA: Primary | ICD-10-CM

## 2022-06-28 DIAGNOSIS — R27.0 ATAXIA: ICD-10-CM

## 2022-06-28 LAB — VIT B12 SERPL-MCNC: 293 PG/ML (ref 193–986)

## 2022-06-28 PROCEDURE — 99204 OFFICE O/P NEW MOD 45 MIN: CPT | Performed by: OTHER

## 2022-06-28 PROCEDURE — 3074F SYST BP LT 130 MM HG: CPT | Performed by: OTHER

## 2022-06-28 PROCEDURE — 3079F DIAST BP 80-89 MM HG: CPT | Performed by: OTHER

## 2022-06-28 PROCEDURE — 82607 VITAMIN B-12: CPT

## 2022-06-28 PROCEDURE — 36415 COLL VENOUS BLD VENIPUNCTURE: CPT

## 2022-06-28 PROCEDURE — 3008F BODY MASS INDEX DOCD: CPT | Performed by: OTHER

## 2022-06-29 ENCOUNTER — PATIENT MESSAGE (OUTPATIENT)
Dept: NEUROLOGY | Facility: CLINIC | Age: 67
End: 2022-06-29

## 2022-07-26 ENCOUNTER — TELEPHONE (OUTPATIENT)
Dept: ADMINISTRATIVE | Facility: HOSPITAL | Age: 67
End: 2022-07-26

## 2022-07-26 NOTE — TELEPHONE ENCOUNTER
Good Afternoon,Please be advise that the MRI BRAIN have not been approved by the Patient Health Plan.   Please see below the Denial    Thanks,  [facility]

## 2022-07-27 NOTE — TELEPHONE ENCOUNTER
Spoke with patient as her test is tomorrow and it is not clear from message below if patient was notified. Noted that this order was placed by Dr. Ousmane Ortiz. She states someone called her and said he would call insurance to try and get this approved prior to her appointment tomorrow.

## 2022-07-28 ENCOUNTER — TELEPHONE (OUTPATIENT)
Dept: NEUROLOGY | Facility: CLINIC | Age: 67
End: 2022-07-28

## 2022-07-28 ENCOUNTER — TELEPHONE (OUTPATIENT)
Dept: ADMINISTRATIVE | Facility: HOSPITAL | Age: 67
End: 2022-07-28

## 2022-07-28 DIAGNOSIS — R27.0 ATAXIA: Primary | ICD-10-CM

## 2022-07-28 NOTE — TELEPHONE ENCOUNTER
CLOSED THIS TE ENCOUNTER     Refer to assigned  [facility] PT AC[facility]  7/28/22 TE for MRI BRAIN (CPT=70615)denial information

## 2022-07-28 NOTE — TELEPHONE ENCOUNTER
Patient called to say she was called by Scheduling and they told her that the MRI she had scheduled for today was being cancelled because Insurance denied it. They told her to call the [facility] to get the reason why and to find out the next steps for her.

## 2022-07-28 NOTE — TELEPHONE ENCOUNTER
GM, Please be advise that the MRI BRAIN has been denied by the Patient Health Plan. Appeal can be done to see if the Denial can be overturnt. I have also contact the Patient and informed her that her test was Denied. Thanks,  [employer] or Appeal that is managed by [employer], and labeled as allowed above, may be submitted in writing via fax to [phone]. Be sure to label your request as a [employer] or Appeal, as appropriate. To request via P2P call [employer] at [phone], option 4. Contact the health plan at the number on the back of the member's ID card to learn about additional options.

## 2022-07-28 NOTE — TELEPHONE ENCOUNTER
Called patient & notified of MRI denial & CT order. Pt verbalized understanding & will call & schedule CT Brain.   Pt has # for central scheduling

## 2022-07-28 NOTE — TELEPHONE ENCOUNTER
Please call the patient tell her insurance has denied the MRI scan. Please tell her from a neurology standpoint this denial is very wrong and inappropiate. Please tell her I will order a CT of the head.

## 2022-07-28 NOTE — TELEPHONE ENCOUNTER
Message left for patient concerning getting her MRI of her brain approved that Dr. Parish Gonzales ordered. I expressed my concern about her getting the test.  Hopefully Dr. Parish Gonzales is able to get it approved. I did tell her that sometimes the insurance companies can be difficult regarding these preapproval process.

## 2022-08-12 ENCOUNTER — TELEPHONE (OUTPATIENT)
Dept: ADMINISTRATIVE | Facility: HOSPITAL | Age: 67
End: 2022-08-12

## 2022-08-12 NOTE — TELEPHONE ENCOUNTER
Good Afternoon, Please be advise that the [facility] 9 CPT CODE#25739 has been Denied by the Patient Health Plan. Reason Note: Not Medically Necessary.     Thanks,  [employer]

## 2022-08-15 NOTE — TELEPHONE ENCOUNTER
Patient called office back after speaking with insurance and was told office needed to provide more documentation.

## 2022-08-15 NOTE — TELEPHONE ENCOUNTER
Patient informed. Will call insurance for more information if she has any questions she will call the office.

## 2022-09-01 ENCOUNTER — HOSPITAL ENCOUNTER (OUTPATIENT)
Dept: CT IMAGING | Facility: HOSPITAL | Age: 67
Discharge: HOME | End: 2022-09-01
Attending: Other
Payer: MEDICARE

## 2022-09-01 ENCOUNTER — TELEPHONE (OUTPATIENT)
Dept: NEUROLOGY | Facility: CLINIC | Age: 67
End: 2022-09-01

## 2022-09-01 DIAGNOSIS — R27.0 ATAXIA: ICD-10-CM

## 2022-09-01 PROCEDURE — 70450 CT HEAD/BRAIN W/O DYE: CPT | Performed by: OTHER

## 2022-09-01 NOTE — TELEPHONE ENCOUNTER
Attempted to call. No Answer. Left message to check my chart.   My chart message sent to notify of normal result

## 2022-09-29 ENCOUNTER — LAB ENCOUNTER (OUTPATIENT)
Dept: LAB | Facility: HOSPITAL | Age: 67
End: 2022-09-29
Attending: INTERNAL MEDICINE
Payer: MEDICARE

## 2022-09-29 DIAGNOSIS — E78.00 HYPERCHOLESTEROLEMIA: ICD-10-CM

## 2022-09-29 DIAGNOSIS — E55.9 VITAMIN D DEFICIENCY: ICD-10-CM

## 2022-09-29 LAB
CHOLEST SERPL-MCNC: 212 MG/DL (ref ?–200)
FASTING PATIENT LIPID ANSWER: YES
HDLC SERPL-MCNC: 50 MG/DL (ref 40–59)
LDLC SERPL CALC-MCNC: 132 MG/DL (ref ?–100)
NONHDLC SERPL-MCNC: 162 MG/DL (ref ?–130)
TRIGL SERPL-MCNC: 171 MG/DL (ref 30–149)
VIT D+METAB SERPL-MCNC: 31.2 NG/ML (ref 30–100)
VLDLC SERPL CALC-MCNC: 31 MG/DL (ref 0–30)

## 2022-09-29 PROCEDURE — 80061 LIPID PANEL: CPT

## 2022-09-29 PROCEDURE — 82306 VITAMIN D 25 HYDROXY: CPT

## 2022-09-29 PROCEDURE — 36415 COLL VENOUS BLD VENIPUNCTURE: CPT

## 2022-11-15 ENCOUNTER — TELEPHONE (OUTPATIENT)
Dept: INTERNAL MEDICINE CLINIC | Facility: CLINIC | Age: 67
End: 2022-11-15

## 2022-11-15 NOTE — TELEPHONE ENCOUNTER
To Dr. Wayne Yanez-- can you please advise on results for patient from 9/29/22 in MD's absence? Thank you!

## 2023-01-11 ENCOUNTER — OFFICE VISIT (OUTPATIENT)
Dept: INTERNAL MEDICINE CLINIC | Facility: CLINIC | Age: 68
End: 2023-01-11

## 2023-01-11 VITALS
OXYGEN SATURATION: 96 % | SYSTOLIC BLOOD PRESSURE: 124 MMHG | HEART RATE: 83 BPM | HEIGHT: 62 IN | BODY MASS INDEX: 26.68 KG/M2 | WEIGHT: 145 LBS | DIASTOLIC BLOOD PRESSURE: 74 MMHG | TEMPERATURE: 100 F

## 2023-01-11 DIAGNOSIS — M76.32 ILIOTIBIAL BAND SYNDROME OF LEFT SIDE: Primary | ICD-10-CM

## 2023-01-11 PROCEDURE — 3078F DIAST BP <80 MM HG: CPT | Performed by: INTERNAL MEDICINE

## 2023-01-11 PROCEDURE — 1125F AMNT PAIN NOTED PAIN PRSNT: CPT | Performed by: INTERNAL MEDICINE

## 2023-01-11 PROCEDURE — 90662 IIV NO PRSV INCREASED AG IM: CPT | Performed by: INTERNAL MEDICINE

## 2023-01-11 PROCEDURE — 99213 OFFICE O/P EST LOW 20 MIN: CPT | Performed by: INTERNAL MEDICINE

## 2023-01-11 PROCEDURE — G0008 ADMIN INFLUENZA VIRUS VAC: HCPCS | Performed by: INTERNAL MEDICINE

## 2023-01-11 PROCEDURE — 3074F SYST BP LT 130 MM HG: CPT | Performed by: INTERNAL MEDICINE

## 2023-01-11 PROCEDURE — 3008F BODY MASS INDEX DOCD: CPT | Performed by: INTERNAL MEDICINE

## 2023-01-11 RX ORDER — MELOXICAM 7.5 MG/1
7.5 TABLET ORAL DAILY
Qty: 30 TABLET | Refills: 0 | Status: SHIPPED | OUTPATIENT
Start: 2023-01-11

## 2023-01-11 RX ORDER — OMEPRAZOLE 20 MG/1
20 TABLET, DELAYED RELEASE ORAL
COMMUNITY

## 2023-01-27 ENCOUNTER — OFFICE VISIT (OUTPATIENT)
Dept: DERMATOLOGY CLINIC | Facility: CLINIC | Age: 68
End: 2023-01-27

## 2023-01-27 DIAGNOSIS — L81.4 LENTIGO: ICD-10-CM

## 2023-01-27 DIAGNOSIS — L57.0 AK (ACTINIC KERATOSIS): Primary | ICD-10-CM

## 2023-01-27 DIAGNOSIS — D22.9 MULTIPLE NEVI: ICD-10-CM

## 2023-01-27 DIAGNOSIS — L82.1 SK (SEBORRHEIC KERATOSIS): ICD-10-CM

## 2023-01-27 DIAGNOSIS — D23.9 BENIGN NEOPLASM OF SKIN, UNSPECIFIED LOCATION: ICD-10-CM

## 2023-04-19 ENCOUNTER — OFFICE VISIT (OUTPATIENT)
Dept: INTERNAL MEDICINE CLINIC | Facility: CLINIC | Age: 68
End: 2023-04-19

## 2023-04-19 VITALS
WEIGHT: 141.19 LBS | OXYGEN SATURATION: 97 % | HEART RATE: 68 BPM | HEIGHT: 62 IN | TEMPERATURE: 98 F | SYSTOLIC BLOOD PRESSURE: 134 MMHG | BODY MASS INDEX: 25.98 KG/M2 | DIASTOLIC BLOOD PRESSURE: 80 MMHG

## 2023-04-19 DIAGNOSIS — L71.9 ROSACEA: ICD-10-CM

## 2023-04-19 DIAGNOSIS — Z00.00 ANNUAL PHYSICAL EXAM: Primary | ICD-10-CM

## 2023-04-19 DIAGNOSIS — E55.9 VITAMIN D DEFICIENCY: ICD-10-CM

## 2023-04-19 DIAGNOSIS — Z86.010 HISTORY OF COLONIC POLYPS: ICD-10-CM

## 2023-04-19 DIAGNOSIS — E78.1 HYPERTRIGLYCERIDEMIA: ICD-10-CM

## 2023-04-19 DIAGNOSIS — Z86.19 HISTORY OF SHINGLES: ICD-10-CM

## 2023-04-19 DIAGNOSIS — R53.83 FATIGUE, UNSPECIFIED TYPE: ICD-10-CM

## 2023-04-19 DIAGNOSIS — M15.9 PRIMARY OSTEOARTHRITIS INVOLVING MULTIPLE JOINTS: ICD-10-CM

## 2023-04-19 DIAGNOSIS — E78.00 HYPERCHOLESTEROLEMIA: ICD-10-CM

## 2023-04-19 DIAGNOSIS — B02.29 POST HERPETIC NEURALGIA: ICD-10-CM

## 2023-04-19 DIAGNOSIS — K57.30 DIVERTICULOSIS OF COLON: ICD-10-CM

## 2023-04-19 DIAGNOSIS — M47.812 OSTEOARTHRITIS OF CERVICAL SPINE, UNSPECIFIED SPINAL OSTEOARTHRITIS COMPLICATION STATUS: ICD-10-CM

## 2023-04-19 DIAGNOSIS — Z12.31 VISIT FOR SCREENING MAMMOGRAM: ICD-10-CM

## 2023-04-19 DIAGNOSIS — Z87.19 HISTORY OF DIVERTICULITIS OF COLON: ICD-10-CM

## 2023-04-19 LAB
ATRIAL RATE: 69 BPM
P AXIS: 55 DEGREES
P-R INTERVAL: 130 MS
Q-T INTERVAL: 394 MS
QRS DURATION: 70 MS
QTC CALCULATION (BEZET): 422 MS
R AXIS: 33 DEGREES
T AXIS: 38 DEGREES
VENTRICULAR RATE: 69 BPM

## 2023-04-19 PROCEDURE — 1126F AMNT PAIN NOTED NONE PRSNT: CPT | Performed by: INTERNAL MEDICINE

## 2023-04-19 NOTE — PATIENT INSTRUCTIONS
Patient is to continue her current diet, medication and activity. I have encouraged patient to get the last COVID booster vaccine. I will place orders in the system for the patient to get blood test and urinalysis as part of her annual physical examination. The orders will include a CBC, CMP, lipid panel, TSH, vitamin D and urinalysis. I will place an order in the system for the patient to get a screening mammogram.  I will tentatively plan to see the patient back in 1 year. I will see the patient back sooner as necessary.

## 2023-04-20 ENCOUNTER — LAB ENCOUNTER (OUTPATIENT)
Dept: LAB | Age: 68
End: 2023-04-20
Attending: INTERNAL MEDICINE
Payer: MEDICARE

## 2023-04-20 ENCOUNTER — TELEPHONE (OUTPATIENT)
Dept: INTERNAL MEDICINE CLINIC | Facility: CLINIC | Age: 68
End: 2023-04-20

## 2023-04-20 DIAGNOSIS — E55.9 VITAMIN D DEFICIENCY: ICD-10-CM

## 2023-04-20 DIAGNOSIS — R53.83 FATIGUE, UNSPECIFIED TYPE: ICD-10-CM

## 2023-04-20 DIAGNOSIS — E78.1 HYPERTRIGLYCERIDEMIA: ICD-10-CM

## 2023-04-20 DIAGNOSIS — Z00.00 ANNUAL PHYSICAL EXAM: ICD-10-CM

## 2023-04-20 DIAGNOSIS — E78.00 HYPERCHOLESTEROLEMIA: ICD-10-CM

## 2023-04-20 DIAGNOSIS — E78.1 HYPERTRIGLYCERIDEMIA: Primary | ICD-10-CM

## 2023-04-20 LAB
ALBUMIN SERPL-MCNC: 3.8 G/DL (ref 3.4–5)
ALBUMIN/GLOB SERPL: 0.9 {RATIO} (ref 1–2)
ALP LIVER SERPL-CCNC: 60 U/L
ALT SERPL-CCNC: 30 U/L
ANION GAP SERPL CALC-SCNC: 5 MMOL/L (ref 0–18)
AST SERPL-CCNC: 17 U/L (ref 15–37)
BASOPHILS # BLD AUTO: 0.05 X10(3) UL (ref 0–0.2)
BASOPHILS NFR BLD AUTO: 0.7 %
BILIRUB SERPL-MCNC: 0.4 MG/DL (ref 0.1–2)
BILIRUB UR QL: NEGATIVE
BUN BLD-MCNC: 19 MG/DL (ref 7–18)
BUN/CREAT SERPL: 29.2 (ref 10–20)
CALCIUM BLD-MCNC: 9.4 MG/DL (ref 8.5–10.1)
CHLORIDE SERPL-SCNC: 107 MMOL/L (ref 98–112)
CHOLEST SERPL-MCNC: 177 MG/DL (ref ?–200)
CLARITY UR: CLEAR
CO2 SERPL-SCNC: 30 MMOL/L (ref 21–32)
CREAT BLD-MCNC: 0.65 MG/DL
DEPRECATED RDW RBC AUTO: 48.7 FL (ref 35.1–46.3)
EOSINOPHIL # BLD AUTO: 0.21 X10(3) UL (ref 0–0.7)
EOSINOPHIL NFR BLD AUTO: 2.8 %
ERYTHROCYTE [DISTWIDTH] IN BLOOD BY AUTOMATED COUNT: 14.3 % (ref 11–15)
FASTING PATIENT LIPID ANSWER: YES
FASTING STATUS PATIENT QL REPORTED: YES
GFR SERPLBLD BASED ON 1.73 SQ M-ARVRAT: 95 ML/MIN/1.73M2 (ref 60–?)
GLOBULIN PLAS-MCNC: 4.2 G/DL (ref 2.8–4.4)
GLUCOSE BLD-MCNC: 99 MG/DL (ref 70–99)
GLUCOSE UR-MCNC: NORMAL MG/DL
HCT VFR BLD AUTO: 43.8 %
HDLC SERPL-MCNC: 48 MG/DL (ref 40–59)
HGB BLD-MCNC: 13.7 G/DL
HGB UR QL STRIP.AUTO: NEGATIVE
IMM GRANULOCYTES # BLD AUTO: 0.03 X10(3) UL (ref 0–1)
IMM GRANULOCYTES NFR BLD: 0.4 %
KETONES UR-MCNC: NEGATIVE MG/DL
LDLC SERPL CALC-MCNC: 94 MG/DL (ref ?–100)
LEUKOCYTE ESTERASE UR QL STRIP.AUTO: NEGATIVE
LYMPHOCYTES # BLD AUTO: 2.04 X10(3) UL (ref 1–4)
LYMPHOCYTES NFR BLD AUTO: 27.4 %
MCH RBC QN AUTO: 28.8 PG (ref 26–34)
MCHC RBC AUTO-ENTMCNC: 31.3 G/DL (ref 31–37)
MCV RBC AUTO: 92.2 FL
MONOCYTES # BLD AUTO: 0.5 X10(3) UL (ref 0.1–1)
MONOCYTES NFR BLD AUTO: 6.7 %
NEUTROPHILS # BLD AUTO: 4.62 X10 (3) UL (ref 1.5–7.7)
NEUTROPHILS # BLD AUTO: 4.62 X10(3) UL (ref 1.5–7.7)
NEUTROPHILS NFR BLD AUTO: 62 %
NITRITE UR QL STRIP.AUTO: NEGATIVE
NONHDLC SERPL-MCNC: 129 MG/DL (ref ?–130)
OSMOLALITY SERPL CALC.SUM OF ELEC: 296 MOSM/KG (ref 275–295)
PH UR: 5 [PH] (ref 5–8)
PLATELET # BLD AUTO: 315 10(3)UL (ref 150–450)
POTASSIUM SERPL-SCNC: 3.9 MMOL/L (ref 3.5–5.1)
PROT SERPL-MCNC: 8 G/DL (ref 6.4–8.2)
PROT UR-MCNC: NEGATIVE MG/DL
RBC # BLD AUTO: 4.75 X10(6)UL
SODIUM SERPL-SCNC: 142 MMOL/L (ref 136–145)
SP GR UR STRIP: 1.01 (ref 1–1.03)
TRIGL SERPL-MCNC: 204 MG/DL (ref 30–149)
TSI SER-ACNC: 2.74 MIU/ML (ref 0.36–3.74)
UROBILINOGEN UR STRIP-ACNC: NORMAL
VIT D+METAB SERPL-MCNC: 35.5 NG/ML (ref 30–100)
VLDLC SERPL CALC-MCNC: 34 MG/DL (ref 0–30)
WBC # BLD AUTO: 7.5 X10(3) UL (ref 4–11)

## 2023-04-20 PROCEDURE — 84443 ASSAY THYROID STIM HORMONE: CPT

## 2023-04-20 PROCEDURE — 36415 COLL VENOUS BLD VENIPUNCTURE: CPT

## 2023-04-20 PROCEDURE — 85025 COMPLETE CBC W/AUTO DIFF WBC: CPT

## 2023-04-20 PROCEDURE — 82306 VITAMIN D 25 HYDROXY: CPT

## 2023-04-20 PROCEDURE — 80061 LIPID PANEL: CPT

## 2023-04-20 PROCEDURE — 80053 COMPREHEN METABOLIC PANEL: CPT

## 2023-04-21 NOTE — TELEPHONE ENCOUNTER
I reviewed patient's lab results. Please notify patient that her recent blood test have turned out well. Please notify her that her CBC has turned out well. There is no anemia or sign of infection. Also her comprehensive metabolic profile is turned out well. Her blood sugar, kidney test electrolytes and liver enzymes have all turned out well. Her lipid panel has shown her cholesterol readings are good her triglyceride reading is slightly elevated she needs to watch her sweets and her carbs. Her thyroid level was good and her vitamin D level was good. Tell her that she is to watch her diet regarding her sweets and her carbs and I will put an order in the system to repeat a lipid panel in about 3 or 4 months. I will forward this message to nursing.   Thank you

## 2023-05-18 ENCOUNTER — HOSPITAL ENCOUNTER (OUTPATIENT)
Dept: MAMMOGRAPHY | Facility: HOSPITAL | Age: 68
Discharge: HOME | End: 2023-05-18
Attending: INTERNAL MEDICINE
Payer: MEDICARE

## 2023-05-18 DIAGNOSIS — Z12.31 VISIT FOR SCREENING MAMMOGRAM: ICD-10-CM

## 2023-05-18 PROCEDURE — 77063 BREAST TOMOSYNTHESIS BI: CPT | Performed by: INTERNAL MEDICINE

## 2023-05-18 PROCEDURE — 77067 SCR MAMMO BI INCL CAD: CPT | Performed by: INTERNAL MEDICINE

## 2023-05-19 ENCOUNTER — TELEPHONE (OUTPATIENT)
Dept: INTERNAL MEDICINE CLINIC | Facility: CLINIC | Age: 68
End: 2023-05-19

## 2023-05-19 NOTE — TELEPHONE ENCOUNTER
Telephone call to pt to notify her that her mammogram has shown some asymmetry in her right breast.  Radiology will call pt to schedule for follow up Mammogram study of her right breast and possible US. Discussed with pt.

## 2023-05-24 ENCOUNTER — HOSPITAL ENCOUNTER (OUTPATIENT)
Dept: ULTRASOUND IMAGING | Facility: HOSPITAL | Age: 68
Discharge: HOME | End: 2023-05-24
Attending: INTERNAL MEDICINE
Payer: MEDICARE

## 2023-05-24 ENCOUNTER — HOSPITAL ENCOUNTER (OUTPATIENT)
Dept: MAMMOGRAPHY | Facility: HOSPITAL | Age: 68
Discharge: HOME | End: 2023-05-24
Attending: INTERNAL MEDICINE
Payer: MEDICARE

## 2023-05-24 DIAGNOSIS — R92.8 ABNORMAL MAMMOGRAM: ICD-10-CM

## 2023-05-24 PROCEDURE — 76642 ULTRASOUND BREAST LIMITED: CPT | Performed by: INTERNAL MEDICINE

## 2023-05-24 PROCEDURE — 77065 DX MAMMO INCL CAD UNI: CPT | Performed by: INTERNAL MEDICINE

## 2023-05-24 PROCEDURE — 77061 BREAST TOMOSYNTHESIS UNI: CPT | Performed by: INTERNAL MEDICINE

## 2023-07-02 ENCOUNTER — TELEPHONE (OUTPATIENT)
Dept: INTERNAL MEDICINE CLINIC | Facility: CLINIC | Age: 68
End: 2023-07-02

## 2024-04-15 ENCOUNTER — TELEPHONE (OUTPATIENT)
Dept: INTERNAL MEDICINE CLINIC | Facility: CLINIC | Age: 69
End: 2024-04-15

## 2024-04-15 DIAGNOSIS — E78.00 HYPERCHOLESTEROLEMIA: Primary | ICD-10-CM

## 2024-04-15 DIAGNOSIS — E55.9 VITAMIN D DEFICIENCY: ICD-10-CM

## 2024-04-15 DIAGNOSIS — R53.83 FATIGUE, UNSPECIFIED TYPE: ICD-10-CM

## 2024-04-15 DIAGNOSIS — Z00.00 ANNUAL PHYSICAL EXAM: ICD-10-CM

## 2024-04-15 DIAGNOSIS — E78.1 HYPERTRIGLYCERIDEMIA: ICD-10-CM

## 2024-04-15 NOTE — TELEPHONE ENCOUNTER
Patient calling to request blood work prior to:    [x] physical    [] check-up      [] other    Patient uses:  [x] [facility] [] [facility]       [facility] location:       Fax #:    Patient prefers to be notified once labs are placed by: [x] phone call  [] my chart message     Pt. Has her PX scheduled for 4/24/24.

## 2024-04-22 NOTE — TELEPHONE ENCOUNTER
Please notify pt that I have approved the orders that are in the system which can be done prior to seeing me on 4/24.  I will forward this message to nursing. Thank you!!

## 2024-04-23 ENCOUNTER — LAB ENCOUNTER (OUTPATIENT)
Dept: LAB | Facility: HOSPITAL | Age: 69
End: 2024-04-23
Attending: INTERNAL MEDICINE
Payer: MEDICARE

## 2024-04-23 DIAGNOSIS — E55.9 VITAMIN D DEFICIENCY: ICD-10-CM

## 2024-04-23 DIAGNOSIS — E78.00 HYPERCHOLESTEROLEMIA: ICD-10-CM

## 2024-04-23 DIAGNOSIS — Z00.00 ANNUAL PHYSICAL EXAM: ICD-10-CM

## 2024-04-23 DIAGNOSIS — R53.83 FATIGUE, UNSPECIFIED TYPE: ICD-10-CM

## 2024-04-23 DIAGNOSIS — E78.1 HYPERTRIGLYCERIDEMIA: ICD-10-CM

## 2024-04-23 LAB
ALBUMIN SERPL-MCNC: 4.6 G/DL (ref 3.2–4.8)
ALBUMIN/GLOB SERPL: 1.4 {RATIO} (ref 1–2)
ALP LIVER SERPL-CCNC: 64 U/L
ALT SERPL-CCNC: 21 U/L
ANION GAP SERPL CALC-SCNC: 5 MMOL/L (ref 0–18)
AST SERPL-CCNC: 21 U/L (ref ?–34)
BASOPHILS # BLD AUTO: 0.05 X10(3) UL (ref 0–0.2)
BASOPHILS NFR BLD AUTO: 0.7 %
BILIRUB SERPL-MCNC: 0.6 MG/DL (ref 0.2–1.1)
BILIRUB UR QL: NEGATIVE
BUN BLD-MCNC: 15 MG/DL (ref 9–23)
BUN/CREAT SERPL: 24.2 (ref 10–20)
CALCIUM BLD-MCNC: 9.5 MG/DL (ref 8.7–10.4)
CHLORIDE SERPL-SCNC: 107 MMOL/L (ref 98–112)
CHOLEST SERPL-MCNC: 192 MG/DL (ref ?–200)
CLARITY UR: CLEAR
CO2 SERPL-SCNC: 30 MMOL/L (ref 21–32)
CREAT BLD-MCNC: 0.62 MG/DL
DEPRECATED RDW RBC AUTO: 48.2 FL (ref 35.1–46.3)
EGFRCR SERPLBLD CKD-EPI 2021: 95 ML/MIN/1.73M2 (ref 60–?)
EOSINOPHIL # BLD AUTO: 0.37 X10(3) UL (ref 0–0.7)
EOSINOPHIL NFR BLD AUTO: 4.9 %
ERYTHROCYTE [DISTWIDTH] IN BLOOD BY AUTOMATED COUNT: 14.6 % (ref 11–15)
FASTING PATIENT LIPID ANSWER: YES
FASTING STATUS PATIENT QL REPORTED: YES
GLOBULIN PLAS-MCNC: 3.3 G/DL (ref 2.8–4.4)
GLUCOSE BLD-MCNC: 89 MG/DL (ref 70–99)
GLUCOSE UR-MCNC: NORMAL MG/DL
HCT VFR BLD AUTO: 41.8 %
HDLC SERPL-MCNC: 48 MG/DL (ref 40–59)
HGB BLD-MCNC: 13.1 G/DL
HGB UR QL STRIP.AUTO: NEGATIVE
IMM GRANULOCYTES # BLD AUTO: 0.03 X10(3) UL (ref 0–1)
IMM GRANULOCYTES NFR BLD: 0.4 %
KETONES UR-MCNC: NEGATIVE MG/DL
LDLC SERPL CALC-MCNC: 116 MG/DL (ref ?–100)
LEUKOCYTE ESTERASE UR QL STRIP.AUTO: NEGATIVE
LYMPHOCYTES # BLD AUTO: 2.13 X10(3) UL (ref 1–4)
LYMPHOCYTES NFR BLD AUTO: 28.2 %
MCH RBC QN AUTO: 28.2 PG (ref 26–34)
MCHC RBC AUTO-ENTMCNC: 31.3 G/DL (ref 31–37)
MCV RBC AUTO: 90.1 FL
MONOCYTES # BLD AUTO: 0.41 X10(3) UL (ref 0.1–1)
MONOCYTES NFR BLD AUTO: 5.4 %
NEUTROPHILS # BLD AUTO: 4.56 X10 (3) UL (ref 1.5–7.7)
NEUTROPHILS # BLD AUTO: 4.56 X10(3) UL (ref 1.5–7.7)
NEUTROPHILS NFR BLD AUTO: 60.4 %
NITRITE UR QL STRIP.AUTO: NEGATIVE
NONHDLC SERPL-MCNC: 144 MG/DL (ref ?–130)
OSMOLALITY SERPL CALC.SUM OF ELEC: 294 MOSM/KG (ref 275–295)
PH UR: 5.5 [PH] (ref 5–8)
PLATELET # BLD AUTO: 285 10(3)UL (ref 150–450)
POTASSIUM SERPL-SCNC: 3.4 MMOL/L (ref 3.5–5.1)
PROT SERPL-MCNC: 7.9 G/DL (ref 5.7–8.2)
PROT UR-MCNC: NEGATIVE MG/DL
RBC # BLD AUTO: 4.64 X10(6)UL
SODIUM SERPL-SCNC: 142 MMOL/L (ref 136–145)
SP GR UR STRIP: 1.01 (ref 1–1.03)
TRIGL SERPL-MCNC: 159 MG/DL (ref 30–149)
TSI SER-ACNC: 3.06 MIU/ML (ref 0.55–4.78)
UROBILINOGEN UR STRIP-ACNC: NORMAL
VIT D+METAB SERPL-MCNC: 37.8 NG/ML (ref 30–100)
VLDLC SERPL CALC-MCNC: 28 MG/DL (ref 0–30)
WBC # BLD AUTO: 7.6 X10(3) UL (ref 4–11)

## 2024-04-23 PROCEDURE — 85025 COMPLETE CBC W/AUTO DIFF WBC: CPT

## 2024-04-23 PROCEDURE — 84443 ASSAY THYROID STIM HORMONE: CPT

## 2024-04-23 PROCEDURE — 80053 COMPREHEN METABOLIC PANEL: CPT

## 2024-04-23 PROCEDURE — 36415 COLL VENOUS BLD VENIPUNCTURE: CPT

## 2024-04-23 PROCEDURE — 82306 VITAMIN D 25 HYDROXY: CPT

## 2024-04-23 PROCEDURE — 81003 URINALYSIS AUTO W/O SCOPE: CPT | Performed by: INTERNAL MEDICINE

## 2024-04-23 PROCEDURE — 80061 LIPID PANEL: CPT

## 2024-04-24 ENCOUNTER — LAB ENCOUNTER (OUTPATIENT)
Dept: LAB | Facility: HOSPITAL | Age: 69
End: 2024-04-24
Attending: INTERNAL MEDICINE
Payer: MEDICARE

## 2024-04-24 ENCOUNTER — HOSPITAL ENCOUNTER (OUTPATIENT)
Dept: GENERAL RADIOLOGY | Facility: HOSPITAL | Age: 69
Discharge: HOME | End: 2024-04-24
Attending: INTERNAL MEDICINE
Payer: MEDICARE

## 2024-04-24 ENCOUNTER — OFFICE VISIT (OUTPATIENT)
Dept: INTERNAL MEDICINE CLINIC | Facility: CLINIC | Age: 69
End: 2024-04-24

## 2024-04-24 VITALS
HEIGHT: 62 IN | DIASTOLIC BLOOD PRESSURE: 84 MMHG | OXYGEN SATURATION: 99 % | SYSTOLIC BLOOD PRESSURE: 140 MMHG | TEMPERATURE: 98 F | HEART RATE: 84 BPM | WEIGHT: 144 LBS | BODY MASS INDEX: 26.5 KG/M2

## 2024-04-24 DIAGNOSIS — M54.50 ACUTE RIGHT-SIDED LOW BACK PAIN WITHOUT SCIATICA: ICD-10-CM

## 2024-04-24 DIAGNOSIS — Z12.31 VISIT FOR SCREENING MAMMOGRAM: ICD-10-CM

## 2024-04-24 DIAGNOSIS — E55.9 VITAMIN D DEFICIENCY: ICD-10-CM

## 2024-04-24 DIAGNOSIS — Z00.00 ANNUAL PHYSICAL EXAM: Primary | ICD-10-CM

## 2024-04-24 DIAGNOSIS — L71.9 ROSACEA: ICD-10-CM

## 2024-04-24 DIAGNOSIS — M47.812 OSTEOARTHRITIS OF CERVICAL SPINE, UNSPECIFIED SPINAL OSTEOARTHRITIS COMPLICATION STATUS: ICD-10-CM

## 2024-04-24 DIAGNOSIS — Z86.19 HISTORY OF SHINGLES: ICD-10-CM

## 2024-04-24 DIAGNOSIS — B02.29 POST HERPETIC NEURALGIA: ICD-10-CM

## 2024-04-24 DIAGNOSIS — Z87.19 HISTORY OF DIVERTICULITIS OF COLON: ICD-10-CM

## 2024-04-24 DIAGNOSIS — R53.83 FATIGUE, UNSPECIFIED TYPE: ICD-10-CM

## 2024-04-24 DIAGNOSIS — E87.6 HYPOKALEMIA: ICD-10-CM

## 2024-04-24 DIAGNOSIS — Z86.010 HISTORY OF COLONIC POLYPS: ICD-10-CM

## 2024-04-24 DIAGNOSIS — E78.00 HYPERCHOLESTEROLEMIA: ICD-10-CM

## 2024-04-24 DIAGNOSIS — E78.1 HYPERTRIGLYCERIDEMIA: ICD-10-CM

## 2024-04-24 DIAGNOSIS — K57.30 DIVERTICULOSIS OF COLON: ICD-10-CM

## 2024-04-24 DIAGNOSIS — M15.9 PRIMARY OSTEOARTHRITIS INVOLVING MULTIPLE JOINTS: ICD-10-CM

## 2024-04-24 LAB
ATRIAL RATE: 83 BPM
P AXIS: 58 DEGREES
P-R INTERVAL: 122 MS
Q-T INTERVAL: 380 MS
QRS DURATION: 70 MS
QTC CALCULATION (BEZET): 446 MS
R AXIS: 42 DEGREES
T AXIS: 50 DEGREES
VENTRICULAR RATE: 83 BPM

## 2024-04-24 PROCEDURE — 3077F SYST BP >= 140 MM HG: CPT | Performed by: INTERNAL MEDICINE

## 2024-04-24 PROCEDURE — 1160F RVW MEDS BY RX/DR IN RCRD: CPT | Performed by: INTERNAL MEDICINE

## 2024-04-24 PROCEDURE — 72170 X-RAY EXAM OF PELVIS: CPT | Performed by: INTERNAL MEDICINE

## 2024-04-24 PROCEDURE — 96160 PT-FOCUSED HLTH RISK ASSMT: CPT | Performed by: INTERNAL MEDICINE

## 2024-04-24 PROCEDURE — G0439 PPPS, SUBSEQ VISIT: HCPCS | Performed by: INTERNAL MEDICINE

## 2024-04-24 PROCEDURE — 3008F BODY MASS INDEX DOCD: CPT | Performed by: INTERNAL MEDICINE

## 2024-04-24 PROCEDURE — 1125F AMNT PAIN NOTED PAIN PRSNT: CPT | Performed by: INTERNAL MEDICINE

## 2024-04-24 PROCEDURE — 93000 ELECTROCARDIOGRAM COMPLETE: CPT | Performed by: INTERNAL MEDICINE

## 2024-04-24 PROCEDURE — 1159F MED LIST DOCD IN RCRD: CPT | Performed by: INTERNAL MEDICINE

## 2024-04-24 PROCEDURE — 99214 OFFICE O/P EST MOD 30 MIN: CPT | Performed by: INTERNAL MEDICINE

## 2024-04-24 PROCEDURE — 3079F DIAST BP 80-89 MM HG: CPT | Performed by: INTERNAL MEDICINE

## 2024-04-24 PROCEDURE — 72110 X-RAY EXAM L-2 SPINE 4/>VWS: CPT | Performed by: INTERNAL MEDICINE

## 2024-04-24 PROCEDURE — 99499 UNLISTED E&M SERVICE: CPT | Performed by: INTERNAL MEDICINE

## 2024-04-24 RX ORDER — CALCIUM CITRATE/VITAMIN D3 200MG-6.25
2 TABLET ORAL DAILY
COMMUNITY

## 2024-04-24 RX ORDER — CEPHRADINE 500 MG
2 CAPSULE ORAL DAILY
COMMUNITY

## 2024-04-24 NOTE — PATIENT INSTRUCTIONS
Patient is to continue her current diet, medication and activity.  I will obtain x-rays of patient's lumbar spine and pelvis to evaluate her right low back pain.  I will give the patient an order for outpatient physical therapy to evaluate her low back pain.  I will place an order in the system for the patient to have her screening mammogram performed.  Patient may plan to get her screening mammogram in the [place] area where she is now living.  Patient is to watch her diet and remain active.  Patient is to have a repeat blood test, P and lipid panel, in about 3 to 4 months.  I would like to get patient's LDL cholesterol below 100.  Patient may need a statin drug such as Lipitor in the future.  Patient's medical records may be transferred to a new physician in [place] as necessary.  Alternatively, the new physician may be able to pull up patient's prior records on the epic computer system.

## 2024-04-25 NOTE — PROGRESS NOTES
HPI:   Kathi L Bosworth is a 69 year old female who  who was seen by me on April 24, 2024 for her Medicare advantage annual physical examination.  Mrs. Bosworth indicates that she has been having various pains.  She has a history of neck pain and has had prior surgery on her neck.  Patient continues to have neck pain.  She also complains of pain in her right low back region this is her main problem at this time.  Patient notes that there is no sciatica associated with this pain.  Patient does not recall any specific injury.  Patient also has some discomfort on the left lateral thigh region.  Patient also has pain in the DIP of the #2 digit of the right foot.  There appears to be a slight swelling at this area.  Patient has a slight rash on her right upper eyelid which she is concerned about is possibly shingles which appears to be getting better.  She does not have much pain in this area.  She has had a history of shingles twice in the past.  She still has this discomfort with some postherpetic neuralgia in the left lower back region.  Patient has been feeling well otherwise.  Patient notes that she and her  have moved to [place] where they live full-time now.  She will most likely be planning to see a new physician in the [place] area in the future.    Wt Readings from Last 6 Encounters:   04/24/24 144 lb (65.3 kg)   04/19/23 141 lb 3.2 oz (64 kg)   01/11/23 145 lb (65.8 kg)   06/28/22 135 lb (61.2 kg)   05/13/22 137 lb 12.8 oz (62.5 kg)   04/04/22 136 lb (61.7 kg)     Body mass index is 26.34 kg/m².       Current Outpatient Medications   Medication Sig Dispense Refill    Calcium Citrate-Vitamin D (CITRACAL PETITES/VITAMIN D) 200-6.25 MG-MCG Oral Tab Take 2 capsules by mouth daily.      Multiple Vitamins-Minerals (MEGAVITE FRUITS & VEGGIES) Oral Tab Take 2 capsules by mouth daily.      Omeprazole Magnesium 20 MG Oral Tab EC Take 1 tablet (20 mg total) by mouth daily as needed.        Past Medical  History:    Allergic rhinitis    Arthritis    Chronic cervical radiculopathy    per [employer]:  Chronic Right Cervical Radiculopathy    Esophageal reflux    Fracture of right ankle    casting    Ganglion cyst of volar aspect of left wrist     2 para 2    2 vaginal deliveries    Lipid screening    per [employer]    Osteoporosis screening    per [employer]    Other ill-defined conditions(799.89)    per [employer]:  \"bulging discs; management:  surgery\"    Rosacea    Management:  tetracycline p.r.n.      Past Surgical History:   Procedure Laterality Date    Cervical spine surgery      Colonoscopy N/A 2022    Procedure: COLONOSCOPY;  Surgeon: Tanner Garcia MD;  [facility] ENDO    Excis primary ganglion wrist Left 2018    Excision ganglion left wrist    Joe localization wire 1 site right (cpt=19281) Right     done over 20yrs ago    Musculoskeletal surgery unlisted      (due to \"bulging discs\")      1981 and     Oral surgery      Other surgical history      Cervical discectomy      Family History   Problem Relation Age of Onset    Hypertension Father     Lipids Father     Stroke Father         CVA (Stroke)    Diabetes Father     Dementia Mother 64    Other (Other) Brother         Autoimmune disease of lungs      Social History:   Social History     Socioeconomic History    Marital status:    Tobacco Use    Smoking status: Never    Smokeless tobacco: Never   Vaping Use    Vaping status: Never Used   Substance and Sexual Activity    Alcohol use: Not Currently     Comment: 0-6    Drug use: No   Other Topics Concern    Caffeine Concern Yes     Comment: Tea, 2 cups daily    Pt has a pacemaker No    Pt has a defibrillator No    Reaction to local anesthetic No          REVIEW OF SYSTEMS:   GENERAL: feels well   SKIN: denies any unusual skin lesions  EYES:denies blurred vision or double vision  HEENT: denies nasal congestion, sinus pain or ST  LUNGS: denies shortness of  breath or cough  CARDIOVASCULAR: denies chest pain, pressure, or palpitations  GI: denies abdominal pain, nausea, vomiting, diarrhea, constipation, hematochezia, or melena  :No urinary complaints  NEURO: denies headaches or dizziness    EXAM:   /84 (BP Location: Left arm, Patient Position: Sitting, Cuff Size: adult)   Pulse 84   Temp 98.4 °F (36.9 °C)   Ht 5' 2\" (1.575 m)   Wt 144 lb (65.3 kg)   SpO2 99%   BMI 26.34 kg/m²     GENERAL: well developed, well nourished,in no apparent distress  SKIN: no rashes,no suspicious lesions  HEENT: atraumatic, normocephalic, normal oropharynx, normal TM's  EYES:PERRLA, EOMI,conjunctiva are clear, sclerae are nonicteric  NECK: supple, no cervical or supraclavicular LAD, no carotid bruits, no JVD  CHEST: no chest tenderness  BREAST: no dominant or suspicious mass, no axillary LAD.  Patient's breasts appear normal.  No breast masses are noted.  LUNGS: clear to auscultation  CARDIO: RRR, normal S1S2, no murmurs  GI: Protuberant, BS are present, no masses or organomegaly  MUSCULOSKELETAL: back is not tender, no pain or swelling in her legs  EXTREMITIES: no edema, all pulses are intact  NEURO; Alert and oriented, CN are intact, DTRs are 1+ bilaterally.  Patient's straight leg raising is good to 90 degrees bilaterally with no back or leg pain.    Patient's EKG had an NSR of 83 bpm with an occasional PVC.  Its axis was a 42 degree angle.  Patient's EKG was normal and was similar to patient's previous EKG.    Patient CBC was normal.  Patient's CMP had an FBS of 89.  Patient's potassium was 3.4.  The remainder of the CMP was normal.  Patient's urinalysis was normal.  Patient's lipid panel had a cholesterol of 192, triglycerides 159, HDL cholesterol is 48 and LDL cholesterol was 1 and 16.  Patient's TSH was normal at 3.058.  Patient's vitamin D level was normal at 37.8.      ASSESSMENT AND PLAN:   1. Annual physical exam  Patient appears stable at this time.  Patient has pain  in her neck and also her right low back and her left lateral thigh.  Patient may continue to use Tylenol extra strength 2 tablets 3 times a day.  She may also use occasional ibuprofen as necessary.  I placed orders in the system for patient have x-rays of her lumbosacral spine and pelvis which she will have today when she leaves our office.  I have also given the patient a written order for physical therapy that she can bring with her to [place] so that she can start physical therapy for her low back discomfort once I review the x-rays of her lumbosacral spine and pelvis.  I did discuss with the patient that her LDL cholesterol is slightly elevated today and if it continues in this range we may need to consider Lipitor.  Patient is indicated that she will watch her diet and exercise on a regular basis.  I recommend to the patient that she follow-up with her physician in [place] concerning this in the future.  Patient will plan to follow-up with a new physician in [place].  Patient will also be due for mammogram in the near future.  I did place an order in system for the patient a mammogram performed in our system.  However, the patient may choose to have a mammogram performed in [place] since she is living there full-time now.    - EKG In-Office [38758]    2. Hypercholesterolemia  Patient's recent lipid panel had a cholesterol of 192, triglycerides 159, HDL cholesterol is 48 and LDL cholesterol of 116..  Patient does not take any medication for this but is diet controlled.  Patient will watch her diet more closely and exercise to try to get her cholesterol readings better.  I told her that I like to get her LDL cholesterol below 100.  I told the patient that if she cannot get the LDL cholesterol below 100 with diet and exercise that she may require a statin drug such as Lipitor in the future.  I recommend to the patient that she get a repeat blood test for lipid panel in about 3 to 4 months.  I have placed  an order in the system for the patient to have this done.  As noted above she may choose to have this done in [place] with a new physician if she wishes.    - Lipid Panel; Future    3. Hypertriglyceridemia  Stable.  CPM.  Patient's recent lipid panel had a triglyceride reading of 159.  This is slightly elevated.  Patient is advised to watch her sweets and her carbs as above.    4. Primary osteoarthritis involving multiple joints  Patient continues to have much arthritis.  One of her main areas of arthritis is her neck.  Patient has discomfort in her right lower back region.  As noted below I will get an x-ray of her lumbar spine and pelvis region.    5. Osteoarthritis of cervical spine, unspecified spinal osteoarthritis complication status  Patient has a long history of cervical osteoarthritis.  Patient previously had an operation on her neck.  Patient may use Tylenol or ibuprofen as necessary.    6. Fatigue, unspecified type  Stable.  CPM.  Patient's EKG was normal.    - EKG In-Office [97232]  - Basic Metabolic Panel (8); Future    7. Rosacea  Stable.  CPM.    8. Vitamin D deficiency  Doing well.  CPM.  Patient's recent vitamin D level is 37.8.    9. Diverticulosis of colon  Stable.  CPM.    10. History of diverticulitis of colon  Patient is doing well at this time.  She has not had a recent recurrence of her diverticulitis.    11. History of colonic polyps  Stable.  CPM.  Patient will need to follow-up with her gastroenterologist regarding her colonic polyps.    12. History of shingles  Stable.  CPM.  Patient has a slight rash on her upper eyelid.  It does look suspicious for possible shingles.  On the other hand it it may be a dermatitis.  Patient will keep an eye on this for now.    13. Post herpetic neuralgia  Patient does have an area of postherpetic neuralgia in the left lower back region.    14. Hypokalemia  Patient's potassium was low at 3.4.  Patient we will continue to monitor this.  Patient with a  basic metabolic profile in the future when she has her lipids checked in 3 to 4 months.    - Basic Metabolic Panel (8); Future    15. Acute right-sided low back pain without sciatica  Patient has pain in the right lower back without sciatica.  I will obtain x-rays of patient's lumbar spine and pelvis.  I have tentatively given the patient an order for physical therapy so that she can initiate physical therapy in [place] where she is currently living.  I will await the results of the patient's x-rays which will be obtained later today.    - XR LUMBAR SPINE (MIN 4 VIEWS) (CPT=72110); Future  - XR PELVIS (1 VIEW) (CPT=72170); Future    16. Visit for screening mammogram  Patient was given an order to have a screening mammogram performed in the near future.  She will be due for her annual mammogram in May.  Patient may choose to have this done in [place].    - [facility] MARVA 2D+3D SCREENING BILAT (CPT=77067/68952); Future      Matthew Roach MD  4/24/2024  7:55 PM

## 2024-04-26 ENCOUNTER — TELEPHONE (OUTPATIENT)
Dept: INTERNAL MEDICINE CLINIC | Facility: CLINIC | Age: 69
End: 2024-04-26

## 2024-04-26 DIAGNOSIS — R93.7 ABNORMAL X-RAY OF PELVIS: Primary | ICD-10-CM

## 2024-04-26 NOTE — TELEPHONE ENCOUNTER
Telephone call to pt to discuss results of Xrays of LS spine and  Pelvis.  Xray of LS spine does show some degenerative changes.  A 3-4 cm sclerotic area is noted in the Right Iliac Crest region.  It may be a bone island or other benign etiology but malignancy needs to be ruled out.  I did discuss with Radiologist on call, Dr Ramon Pizano.  He felt the area is probably a bone island or a benign process but did recommend obtaining a CT scan of the Right SI joint with emphasis on the Right Iliac Crest Sclerotic region.  I will place an order in the system for this test.  Pt is to call back next week or I will call pt back later.

## 2024-05-02 DIAGNOSIS — Z12.31 VISIT FOR SCREENING MAMMOGRAM: Primary | ICD-10-CM

## 2024-05-06 ENCOUNTER — HOSPITAL ENCOUNTER (OUTPATIENT)
Dept: CT IMAGING | Facility: HOSPITAL | Age: 69
Discharge: HOME | End: 2024-05-06
Attending: INTERNAL MEDICINE
Payer: MEDICARE

## 2024-05-06 DIAGNOSIS — R93.7 ABNORMAL X-RAY OF PELVIS: ICD-10-CM

## 2024-05-06 PROCEDURE — 72192 CT PELVIS W/O DYE: CPT | Performed by: INTERNAL MEDICINE

## 2024-05-09 ENCOUNTER — TELEPHONE (OUTPATIENT)
Dept: INTERNAL MEDICINE CLINIC | Facility: CLINIC | Age: 69
End: 2024-05-09

## 2024-05-09 NOTE — TELEPHONE ENCOUNTER
Patient calling, viewed CT Pelvic results on [email or web address].  States she doesn't understand results, would like to speak to someone.    Best call back number [phone]

## 2024-05-09 NOTE — TELEPHONE ENCOUNTER
Telephone call to patient and situation discussed.  The abnormality that was previously noted on the prior x-ray has shown up on the CT scan of the pelvis to be arthritis involving the right sacroiliac joint.  It appears to be arthritis.  There does not appear to be a tumor or other etiology causing the appearance.  Patient does complain of some pain in her low back when she does lay down at night.  I have recommended to the patient that she can take Tylenol Extra Strength 2 tablets before she goes to bed or may take 2 tablets 3 times a day as necessary.

## 2024-06-01 ENCOUNTER — HOSPITAL ENCOUNTER (OUTPATIENT)
Dept: MAMMOGRAPHY | Facility: HOSPITAL | Age: 69
Discharge: HOME | End: 2024-06-01
Attending: INTERNAL MEDICINE
Payer: MEDICARE

## 2024-06-01 ENCOUNTER — OFFICE VISIT (OUTPATIENT)
Dept: FAMILY MEDICINE CLINIC | Facility: CLINIC | Age: 69
End: 2024-06-01
Payer: COMMERCIAL

## 2024-06-01 VITALS
TEMPERATURE: 98 F | RESPIRATION RATE: 16 BRPM | DIASTOLIC BLOOD PRESSURE: 68 MMHG | SYSTOLIC BLOOD PRESSURE: 138 MMHG | HEART RATE: 72 BPM | OXYGEN SATURATION: 100 % | BODY MASS INDEX: 26 KG/M2 | HEIGHT: 62 IN

## 2024-06-01 DIAGNOSIS — Z12.31 VISIT FOR SCREENING MAMMOGRAM: ICD-10-CM

## 2024-06-01 DIAGNOSIS — H02.9 EYELID SYMPTOM: Primary | ICD-10-CM

## 2024-06-01 LAB — HM MAMMOGRAPHY BILATERAL: NORMAL

## 2024-06-01 PROCEDURE — 99213 OFFICE O/P EST LOW 20 MIN: CPT | Performed by: NURSE PRACTITIONER

## 2024-06-01 PROCEDURE — 3075F SYST BP GE 130 - 139MM HG: CPT | Performed by: NURSE PRACTITIONER

## 2024-06-01 PROCEDURE — 1159F MED LIST DOCD IN RCRD: CPT | Performed by: NURSE PRACTITIONER

## 2024-06-01 PROCEDURE — 3078F DIAST BP <80 MM HG: CPT | Performed by: NURSE PRACTITIONER

## 2024-06-01 PROCEDURE — 77067 SCR MAMMO BI INCL CAD: CPT | Performed by: INTERNAL MEDICINE

## 2024-06-01 PROCEDURE — 77063 BREAST TOMOSYNTHESIS BI: CPT | Performed by: INTERNAL MEDICINE

## 2024-06-01 RX ORDER — VALACYCLOVIR HYDROCHLORIDE 1 G/1
1000 TABLET, FILM COATED ORAL EVERY 8 HOURS
Qty: 21 TABLET | Refills: 0 | Status: SHIPPED | OUTPATIENT
Start: 2024-06-01 | End: 2024-06-08

## 2024-06-01 NOTE — PROGRESS NOTES
CHIEF COMPLAINT:     Chief Complaint   Patient presents with    Eye Problem     Sx 6 weeks - R upper eyelid redness, itchiness, sores, watery, crustiness in AM  Declines vision test  Denies vision changes, dryness  OTC hydrocortisone cream          HPI:    Kathi L Bosworth is a 69 year old female who presents for evaluation of rash to right upper eyelid for 6 weeks. Reports persisting symptoms. Feels stinging pain sensation to upper eyelid lesions. Reports sometimes form into pustules. It is only on the right side of her face. Feels well otherwise. Denies visual changes or new headaches. Denies ear pain, facial numbness or facial pain. Denies new foods, chemicals, meds or facial products. She has tried hydrocortisone cream without relief.     PCP visit  noted possible eyelid dermatitis or shingles. No treatment. She reports she did see an eye doctor when symptoms started and eye doctor told her she did not have shingles, no treatment at that time. She does have history of rosacea, this does not feel the same.     Reports hx of shingles twice to thoracic region with postherpetic neuralgia to left side.   Shingles vaccine up to date.     Current Outpatient Medications   Medication Sig Dispense Refill    valACYclovir 1 G Oral Tab Take 1 tablet (1,000 mg total) by mouth every 8 (eight) hours for 7 days. 21 tablet 0    Calcium Citrate-Vitamin D (CITRACAL PETITES/VITAMIN D) 200-6.25 MG-MCG Oral Tab Take 2 capsules by mouth daily.      Multiple Vitamins-Minerals (MEGAVITE FRUITS & VEGGIES) Oral Tab Take 2 capsules by mouth daily.      Omeprazole Magnesium 20 MG Oral Tab EC Take 1 tablet (20 mg total) by mouth daily as needed.        Past Medical History:    Allergic rhinitis    Arthritis    Chronic cervical radiculopathy    per [employer]:  Chronic Right Cervical Radiculopathy    Esophageal reflux    Fracture of right ankle    casting    Ganglion cyst of volar aspect of left wrist     2 para 2    2 vaginal  deliveries    Lipid screening    per [employer]    Osteoporosis screening    per [employer]    Other ill-defined conditions(799.89)    per [employer]:  \"bulging discs; management:  surgery\"    Rosacea    Management:  tetracycline p.r.n.      Past Surgical History:   Procedure Laterality Date    Cervical spine surgery  2008    Colonoscopy N/A 2022    Procedure: COLONOSCOPY;  Surgeon: Tanner Garcia MD;  Location: [facility]    Excis primary ganglion wrist Left 2018    Excision ganglion left wrist    Joe localization wire 1 site right (cpt=19281) Right     done over 20yrs ago    Musculoskeletal surgery unlisted      (due to \"bulging discs\")      1981 and     Oral surgery      Other surgical history      Cervical discectomy      Family History   Problem Relation Age of Onset    Hypertension Father     Lipids Father     Stroke Father         CVA (Stroke)    Diabetes Father     Dementia Mother 64    Other (Other) Brother         Autoimmune disease of lungs      Social History     Socioeconomic History    Marital status:    Tobacco Use    Smoking status: Never    Smokeless tobacco: Never   Vaping Use    Vaping status: Never Used   Substance and Sexual Activity    Alcohol use: Not Currently     Comment: 0-6    Drug use: No   Other Topics Concern    Caffeine Concern Yes     Comment: Tea, 2 cups daily    Pt has a pacemaker No    Pt has a defibrillator No    Reaction to local anesthetic No         REVIEW OF SYSTEMS:   GENERAL: feels well otherwise, no fever, no chills.  SKIN: Per HPI. No edema. No ulcerations.  EYES: Denies blurred vision or double vision  CARDIOVASCULAR: Denies chest pains or palpitations.  LUNGS: Denies shortness of breath with exertion or rest. No cough or wheezing.  LYMPH: Denies enlargement of the lymph nodes.  NEURO: Denies abnormal sensation, tingling of the skin, or numbness.      EXAM:   /68   Pulse 72   Temp 98.2 °F (36.8 °C)   Resp 16   Ht 5' 2\"  (1.575 m)   SpO2 100%   BMI 26.34 kg/m²   GENERAL: well developed, well nourished,in no apparent distress  SKIN: Rash/lesion(s): erythematous lesions to right upper eyelid, varying stages, one pustular. Also with similar developing lesion to right cheek. Reports stinging and moderate tenderness to palpation. See pic    EYES: PERRLA, EOMI, conjunctiva are clear. No drainage. No periorbital erythema or swelling.  Vision screening WNL  HENT: Head atraumatic, normocephalic. TM's WNL bilaterally. Normal external nose. Abrasion to nose. Nasal mucosa pink without edema. No erythema of the throat. Oropharynx moist without lesions.  NECK:  Supple. Non tender.  LUNGS: Clear to auscultation bilaterally. No increased work of breathing.   CARDIO: RRR without murmur  LYMPH: No  lymphadenopathy.   NEURO: No facial weakness or numbness.     ASSESSMENT AND PLAN:   Kathi L Bosworth is a 69 year old female who presents for evaluation of a rash. Findings are consistent with:    ASSESSMENT:  Encounter Diagnosis   Name Primary?    Eyelid symptom Yes       PLAN:  Shingles vs. Ocular rosacea ? Symptoms are unilateral. Denies ever having symptoms to left eyelid, central region.   Meds and instructions as listed below.  Comfort measures as described in Patient Instructions.    Instructions per AVS  Instructed to make appointment with eye doctor and dermatology for further evaluation.     Meds & Refills for this Visit:  Requested Prescriptions     Signed Prescriptions Disp Refills    valACYclovir 1 G Oral Tab 21 tablet 0     Sig: Take 1 tablet (1,000 mg total) by mouth every 8 (eight) hours for 7 days.         Verbalizes understanding of these issues and agrees to the plan.

## 2024-06-04 ENCOUNTER — TELEPHONE (OUTPATIENT)
Dept: INTERNAL MEDICINE CLINIC | Facility: CLINIC | Age: 69
End: 2024-06-04

## 2024-06-05 NOTE — TELEPHONE ENCOUNTER
Please notify pt that her mammogram is similar to her previous mammogram. No sign of cancer.  I will forward this message to nursing.  Thank you!!

## 2024-06-20 ENCOUNTER — APPOINTMENT (OUTPATIENT)
Dept: FAMILY MEDICINE | Age: 69
End: 2024-06-20

## 2024-06-20 VITALS
HEIGHT: 62 IN | RESPIRATION RATE: 16 BRPM | SYSTOLIC BLOOD PRESSURE: 132 MMHG | DIASTOLIC BLOOD PRESSURE: 70 MMHG | WEIGHT: 142 LBS | BODY MASS INDEX: 26.13 KG/M2 | OXYGEN SATURATION: 98 % | HEART RATE: 79 BPM

## 2024-06-20 DIAGNOSIS — M53.3 CHRONIC RIGHT SI JOINT PAIN: ICD-10-CM

## 2024-06-20 DIAGNOSIS — Z76.89 ENCOUNTER TO ESTABLISH CARE: ICD-10-CM

## 2024-06-20 DIAGNOSIS — L57.8 SUN-DAMAGED SKIN: ICD-10-CM

## 2024-06-20 DIAGNOSIS — H02.843 EDEMA OF RIGHT EYELID: Primary | ICD-10-CM

## 2024-06-20 DIAGNOSIS — G89.29 CHRONIC RIGHT SI JOINT PAIN: ICD-10-CM

## 2024-06-20 PROCEDURE — 99204 OFFICE O/P NEW MOD 45 MIN: CPT | Performed by: FAMILY MEDICINE

## 2024-06-20 RX ORDER — METHYLPREDNISOLONE 4 MG/1
4 TABLET ORAL SEE ADMIN INSTRUCTIONS
Qty: 21 TABLET | Refills: 0 | Status: SHIPPED | OUTPATIENT
Start: 2024-06-20

## 2024-06-20 RX ORDER — OMEPRAZOLE 20 MG/1
20 CAPSULE, DELAYED RELEASE ORAL DAILY
COMMUNITY

## 2024-06-26 ENCOUNTER — HOSPITAL ENCOUNTER (OUTPATIENT)
Dept: REHABILITATION | Age: 69
Discharge: STILL ADMITTED OR ADMINISTRATIVE | End: 2024-06-26
Attending: FAMILY MEDICINE

## 2024-06-26 PROCEDURE — 97530 THERAPEUTIC ACTIVITIES: CPT | Performed by: PHYSICAL THERAPIST

## 2024-06-26 PROCEDURE — 97161 PT EVAL LOW COMPLEX 20 MIN: CPT | Performed by: PHYSICAL THERAPIST

## 2024-06-26 PROCEDURE — 97110 THERAPEUTIC EXERCISES: CPT | Performed by: PHYSICAL THERAPIST

## 2024-06-26 PROCEDURE — 10004173 HB COUNTER-THERAPY VISIT PT: Performed by: PHYSICAL THERAPIST

## 2024-06-26 ASSESSMENT — MOVEMENT AND STRENGTH ASSESSMENTS
GETTING INTO OR OUT OF A CAR: A LITTLE BIT OF DIFFICULTY
STANDING FOR 1 HOUR: NO DIFFICULTY
WALKING 2 BLOCKS: NO DIFFICULTY
WALKING BETWEEN ROOMS: NO DIFFICULTY
WALKING A MILE: A LITTLE BIT OF DIFFICULTY
RUNNING ON UNEVEN GROUND: EXTREME DIFFICULTY OR UNABLE TO PERFORM ACTIVITY
SITTING FOR 1 HOUR: A LITTLE BIT OF DIFFICULTY
PERFORMING HEAVY ACTIVITIES AROUND YOUR HOME: MODERATE DIFFICULTY
SQUATTING: MODERATE DIFFICULTY
ANY OF YOUR USUAL WORK, HOUSEWORK OR SCHOOL ACTIVITIES: MODERATE DIFFICULTY
YOUR USUAL HOBBIES, RECREATIONAL OR SPORTING ACTIVIITIES: MODERATE DIFFICULTY
GETTING INTO OR OUT OF THE BATH: A LITTLE BIT OF DIFFICULTY
PUTTING ON YOUR SHOES OR SOCKS: MODERATE DIFFICULTY
PERFORMING LIGHT ACTIVITES AROUND YOUR HOME: NO DIFFICULTY
TOTAL SCORE: 57.5
HOPPING: EXTREME DIFFICULTY OR UNABLE TO PERFORM ACTIVITY
GOING UP OR DOWN 10 STAIRS (ABOUT 1 FLIGHT OF STAIRS): A LITTLE BIT OF DIFFICULTY
LIFTING AN OBJECT, LIKE A BAG OF GROCERIES, FROM THE FLOOR: A LITTLE BIT OF DIFFICULTY
ROLLING OVER IN BED: MODERATE DIFFICULTY
MAKING SHARP TURNS WHILE RUNNING FAST: EXTREME DIFFICULTY OR UNABLE TO PERFORM ACTIVITY
RUNNING ON EVEN GROUND: EXTREME DIFFICULTY OR UNABLE TO PERFORM ACTIVITY

## 2024-06-26 ASSESSMENT — ENCOUNTER SYMPTOMS
ALLEVIATING FACTORS: HEAT
QUALITY: STIFF
PAIN FREQUENCY: CONSTANT
ALLEVIATING FACTORS: OVER-THE-COUNTER MEDICATION
QUALITY: DISCOMFORT
QUALITY: RADIATING
PAIN SCALE AT LOWEST: 2
QUALITY: TIGHT
ALLEVIATING FACTORS: REST
PAIN SEVERITY NOW: 6
SUBJECTIVE PAIN PROGRESSION: WORSENING
ALLEVIATING FACTORS: CHANGE IN POSITION
QUALITY: SHARP
SUBJECTIVE PAIN PROGRESSION: IMPROVED
QUALITY: SHOOTING
ALLEVIATING FACTORS: AVOIDING MOVEMENT IN INVOLVED AREA
PAIN SCALE AT HIGHEST: 9

## 2024-06-27 ENCOUNTER — OFFICE VISIT (OUTPATIENT)
Dept: INTERNAL MEDICINE | Age: 69
End: 2024-06-27

## 2024-06-27 ENCOUNTER — NURSE TRIAGE (OUTPATIENT)
Dept: FAMILY MEDICINE | Age: 69
End: 2024-06-27

## 2024-06-27 VITALS
HEIGHT: 62 IN | TEMPERATURE: 98.4 F | SYSTOLIC BLOOD PRESSURE: 122 MMHG | DIASTOLIC BLOOD PRESSURE: 78 MMHG | BODY MASS INDEX: 26.26 KG/M2 | HEART RATE: 76 BPM | RESPIRATION RATE: 16 BRPM | OXYGEN SATURATION: 98 % | WEIGHT: 142.7 LBS

## 2024-06-27 DIAGNOSIS — H01.115 ALLERGIC DERMATITIS OF UPPER AND LOWER LIDS OF BOTH EYES: Primary | ICD-10-CM

## 2024-06-27 DIAGNOSIS — H01.114 ALLERGIC DERMATITIS OF UPPER AND LOWER LIDS OF BOTH EYES: Primary | ICD-10-CM

## 2024-06-27 DIAGNOSIS — H01.111 ALLERGIC DERMATITIS OF UPPER AND LOWER LIDS OF BOTH EYES: Primary | ICD-10-CM

## 2024-06-27 DIAGNOSIS — H01.112 ALLERGIC DERMATITIS OF UPPER AND LOWER LIDS OF BOTH EYES: Primary | ICD-10-CM

## 2024-06-28 ENCOUNTER — NURSE TRIAGE (OUTPATIENT)
Dept: INTERNAL MEDICINE | Age: 69
End: 2024-06-28

## 2024-06-28 ENCOUNTER — OFFICE VISIT (OUTPATIENT)
Dept: INTERNAL MEDICINE | Age: 69
End: 2024-06-28

## 2024-06-28 VITALS
BODY MASS INDEX: 26.13 KG/M2 | OXYGEN SATURATION: 99 % | HEART RATE: 87 BPM | RESPIRATION RATE: 16 BRPM | DIASTOLIC BLOOD PRESSURE: 70 MMHG | HEIGHT: 62 IN | TEMPERATURE: 98.3 F | SYSTOLIC BLOOD PRESSURE: 130 MMHG | WEIGHT: 142 LBS

## 2024-06-28 DIAGNOSIS — L30.9 PERIORBITAL DERMATITIS: Primary | ICD-10-CM

## 2024-06-28 RX ORDER — DOXYCYCLINE 100 MG/1
100 TABLET ORAL 2 TIMES DAILY
Qty: 90 TABLET | Refills: 0 | Status: SHIPPED | OUTPATIENT
Start: 2024-06-28

## 2024-07-02 ENCOUNTER — HOSPITAL ENCOUNTER (OUTPATIENT)
Dept: REHABILITATION | Age: 69
Discharge: STILL ADMITTED OR ADMINISTRATIVE | End: 2024-07-02
Attending: FAMILY MEDICINE

## 2024-07-02 PROCEDURE — 10004173 HB COUNTER-THERAPY VISIT PT: Performed by: PHYSICAL THERAPY ASSISTANT

## 2024-07-02 PROCEDURE — 97110 THERAPEUTIC EXERCISES: CPT | Performed by: PHYSICAL THERAPY ASSISTANT

## 2024-07-02 PROCEDURE — 97140 MANUAL THERAPY 1/> REGIONS: CPT | Performed by: PHYSICAL THERAPY ASSISTANT

## 2024-07-02 PROCEDURE — 97035 APP MDLTY 1+ULTRASOUND EA 15: CPT | Performed by: PHYSICAL THERAPY ASSISTANT

## 2024-07-03 ENCOUNTER — TELEPHONE (OUTPATIENT)
Dept: DERMATOLOGY | Age: 69
End: 2024-07-03

## 2024-07-15 ENCOUNTER — HOSPITAL ENCOUNTER (OUTPATIENT)
Dept: REHABILITATION | Age: 69
Discharge: STILL ADMITTED OR ADMINISTRATIVE | End: 2024-07-15
Attending: FAMILY MEDICINE

## 2024-07-15 ENCOUNTER — NURSE TRIAGE (OUTPATIENT)
Dept: FAMILY MEDICINE | Age: 69
End: 2024-07-15

## 2024-07-15 PROCEDURE — 97110 THERAPEUTIC EXERCISES: CPT | Performed by: PHYSICAL THERAPIST

## 2024-07-15 PROCEDURE — 97035 APP MDLTY 1+ULTRASOUND EA 15: CPT | Performed by: PHYSICAL THERAPIST

## 2024-07-15 PROCEDURE — 10004173 HB COUNTER-THERAPY VISIT PT: Performed by: PHYSICAL THERAPIST

## 2024-07-15 PROCEDURE — 97140 MANUAL THERAPY 1/> REGIONS: CPT | Performed by: PHYSICAL THERAPIST

## 2024-07-15 ASSESSMENT — ENCOUNTER SYMPTOMS: PAIN SEVERITY NOW: 2

## 2024-07-16 ENCOUNTER — TELEPHONE (OUTPATIENT)
Dept: DERMATOLOGY | Age: 69
End: 2024-07-16

## 2024-07-25 ENCOUNTER — APPOINTMENT (OUTPATIENT)
Dept: REHABILITATION | Age: 69
End: 2024-07-25
Attending: FAMILY MEDICINE

## 2024-07-26 ENCOUNTER — APPOINTMENT (OUTPATIENT)
Dept: FAMILY MEDICINE | Age: 69
End: 2024-07-26

## 2024-07-26 ENCOUNTER — HOSPITAL ENCOUNTER (OUTPATIENT)
Dept: REHABILITATION | Age: 69
Discharge: STILL ADMITTED OR ADMINISTRATIVE | End: 2024-07-26
Attending: FAMILY MEDICINE

## 2024-07-26 PROCEDURE — 97035 APP MDLTY 1+ULTRASOUND EA 15: CPT | Performed by: PHYSICAL THERAPY ASSISTANT

## 2024-07-26 PROCEDURE — 97110 THERAPEUTIC EXERCISES: CPT | Performed by: PHYSICAL THERAPY ASSISTANT

## 2024-07-26 PROCEDURE — 97140 MANUAL THERAPY 1/> REGIONS: CPT | Performed by: PHYSICAL THERAPY ASSISTANT

## 2024-07-26 PROCEDURE — 10004173 HB COUNTER-THERAPY VISIT PT: Performed by: PHYSICAL THERAPY ASSISTANT

## 2024-07-29 ENCOUNTER — TELEPHONE (OUTPATIENT)
Dept: DERMATOLOGY CLINIC | Facility: CLINIC | Age: 69
End: 2024-07-29

## 2024-07-29 ENCOUNTER — OFFICE VISIT (OUTPATIENT)
Dept: DERMATOLOGY CLINIC | Facility: CLINIC | Age: 69
End: 2024-07-29

## 2024-07-29 DIAGNOSIS — L71.9 ROSACEA: Primary | ICD-10-CM

## 2024-07-29 DIAGNOSIS — L82.1 SK (SEBORRHEIC KERATOSIS): ICD-10-CM

## 2024-07-29 DIAGNOSIS — L81.4 LENTIGO: ICD-10-CM

## 2024-07-29 DIAGNOSIS — L57.0 AK (ACTINIC KERATOSIS): ICD-10-CM

## 2024-07-29 DIAGNOSIS — D23.9 BENIGN NEOPLASM OF SKIN, UNSPECIFIED LOCATION: ICD-10-CM

## 2024-07-29 RX ORDER — METHYLPREDNISOLONE 4 MG/1
1 TABLET ORAL AS DIRECTED
COMMUNITY
Start: 2024-06-20

## 2024-07-29 RX ORDER — PIMECROLIMUS 10 MG/G
CREAM TOPICAL
Qty: 30 G | Refills: 1 | Status: SHIPPED | OUTPATIENT
Start: 2024-07-29

## 2024-07-29 RX ORDER — DOXYCYCLINE 100 MG/1
100 TABLET ORAL 2 TIMES DAILY
COMMUNITY
Start: 2024-06-28

## 2024-07-29 NOTE — TELEPHONE ENCOUNTER
Medication PA Requested:   pimecrolimus (ELIDEL) 1 % External Cream                                                        CoverMyMeds Used:  Key:  Quantity: 30 g  Day Supply:  Sig: Use bid as directed to affected areas on face   DX Code:  L71.9                                   CPT code (if applicable):   Case Number/Pending Ref#:     Thank you!

## 2024-07-29 NOTE — PROGRESS NOTES
Kathi L Bosworth is a 69 year old female.  HPI:     CC:    Chief Complaint   Patient presents with    Rash     LOV . Pt present with a rash around the eyes. Pt denies any hx of skin CA. Pt states the rash started 2-3 months ago, started as blisters on R eyelid, then progressed under the eyes and skin was red and inflamed. Pt was prescribed hydrocortisone, doxy, and metronidazole cream; no improvement.         Allergies:  Indomethacin and Vicodin [hydrocodone-acetaminophen]    HISTORY:    Past Medical History:    Allergic rhinitis    Arthritis    Chronic cervical radiculopathy    per [employer]:  Chronic Right Cervical Radiculopathy    Esophageal reflux    Fracture of right ankle    casting    Ganglion cyst of volar aspect of left wrist     2 para 2    2 vaginal deliveries    Lipid screening    per [employer]    Osteoporosis screening    per [employer]    Other ill-defined conditions(799.89)    per [employer]:  \"bulging discs; management:  surgery\"    Rosacea    Management:  tetracycline p.r.n.      Past Surgical History:   Procedure Laterality Date    Cervical spine surgery      Colonoscopy N/A 2022    Procedure: COLONOSCOPY;  Surgeon: Tanner Garcia MD;  Location: [facility]    Excis primary ganglion wrist Left 2018    Excision ganglion left wrist    Joe localization wire 1 site right (cpt=19281) Right     done over 20yrs ago    Musculoskeletal surgery unlisted      (due to \"bulging discs\")      1981 and     Oral surgery      Other surgical history  2008    Cervical discectomy      Family History   Problem Relation Age of Onset    Hypertension Father     Lipids Father     Stroke Father         CVA (Stroke)    Diabetes Father     Dementia Mother 64    Other (Other) Brother         Autoimmune disease of lungs      Social History     Socioeconomic History    Marital status:    Tobacco Use    Smoking status: Never    Smokeless tobacco: Never   Vaping Use    Vaping  status: Never Used   Substance and Sexual Activity    Alcohol use: Not Currently     Comment: 0-6    Drug use: No   Other Topics Concern    Caffeine Concern Yes     Comment: Tea, 2 cups daily    Pt has a pacemaker No    Pt has a defibrillator No    Reaction to local anesthetic No        Current Outpatient Medications   Medication Sig Dispense Refill    Doxycycline Monohydrate 100 MG Oral Tab Take 100 mg by mouth 2 (two) times daily.      pimecrolimus (ELIDEL) 1 % External Cream Use bid as directed to affected areas on face 30 g 1    Calcium Citrate-Vitamin D (CITRACAL PETITES/VITAMIN D) 200-6.25 MG-MCG Oral Tab Take 2 capsules by mouth daily.      Multiple Vitamins-Minerals (MEGAVITE FRUITS & VEGGIES) Oral Tab Take 2 capsules by mouth daily.      Omeprazole Magnesium 20 MG Oral Tab EC Take 1 tablet (20 mg total) by mouth daily as needed.      methylPREDNISolone 4 MG Oral Tablet Therapy Pack Take 1 tablet (4 mg total) by mouth As Directed. (Patient not taking: Reported on 2024)      metRONIDAZOLE 0.75 % External Cream Apply topically 2 (two) times daily. (Patient not taking: Reported on 2024)       Allergies:   Allergies   Allergen Reactions    Indomethacin OTHER (SEE COMMENTS)     emesis    Vicodin [Hydrocodone-Acetaminophen]      emesis       Past Medical History:    Allergic rhinitis    Arthritis    Chronic cervical radiculopathy    per [employer]:  Chronic Right Cervical Radiculopathy    Esophageal reflux    Fracture of right ankle    casting    Ganglion cyst of volar aspect of left wrist     2 para 2    2 vaginal deliveries    Lipid screening    per [employer]    Osteoporosis screening    per [employer]    Other ill-defined conditions(799.89)    per [employer]:  \"bulging discs; management:  surgery\"    Rosacea    Management:  tetracycline p.r.n.     Past Surgical History:   Procedure Laterality Date    Cervical spine surgery      Colonoscopy N/A 2022    Procedure: COLONOSCOPY;  Surgeon: Jose  Tanner GALINDO MD;  Location: [facility] END[facility]    Excis primary ganglion wrist Left 2018    Excision ganglion left wrist    Joe localization wire 1 site right (cpt=19281) Right     done over 20yrs ago    Musculoskeletal surgery unlisted      (due to \"bulging discs\")      1981 and     Oral surgery      Other surgical history      Cervical discectomy     Social History     Socioeconomic History    Marital status:      Spouse name: Not on file    Number of children: Not on file    Years of education: Not on file    Highest education level: Not on file   Occupational History    Not on file   Tobacco Use    Smoking status: Never    Smokeless tobacco: Never   Vaping Use    Vaping status: Never Used   Substance and Sexual Activity    Alcohol use: Not Currently     Comment: 0-6    Drug use: No    Sexual activity: Not on file   Other Topics Concern     Service Not Asked    Blood Transfusions Not Asked    Caffeine Concern Yes     Comment: Tea, 2 cups daily    Occupational Exposure Not Asked    Hobby Hazards Not Asked    Sleep Concern Not Asked    Stress Concern Not Asked    Weight Concern Not Asked    Special Diet Not Asked    Back Care Not Asked    Exercise Not Asked    Bike Helmet Not Asked    Seat Belt Not Asked    Self-Exams Not Asked    Grew up on a farm Not Asked    History of tanning Not Asked    Outdoor occupation Not Asked    Pt has a pacemaker No    Pt has a defibrillator No    Breast feeding Not Asked    Reaction to local anesthetic No   Social History Narrative    Not on file     Social Determinants of Health     Financial Resource Strain: Not on file   Food Insecurity: Not on file   Transportation Needs: Not on file   Physical Activity: Not on file   Stress: Not on file   Social Connections: Not on file   Housing Stability: Not on file     Family History   Problem Relation Age of Onset    Hypertension Father     Lipids Father     Stroke Father         CVA (Stroke)    Diabetes  Father     Dementia Mother 64    Other (Other) Brother         Autoimmune disease of lungs       There were no vitals filed for this visit.    HPI:  Chief Complaint   Patient presents with    Rash     LOV 01/23. Pt present with a rash around the eyes. Pt denies any hx of skin CA. Pt states the rash started 2-3 months ago, started as blisters on R eyelid, then progressed under the eyes and skin was red and inflamed. Pt was prescribed hydrocortisone, doxy, and metronidazole cream; no improvement.     Patient has photos on her phone of lesions at start  R eye only with red papules, numerous at r upper lid, then lower and r eyelids swollen.  Started in April, seen by PCP, no treatment then May seen by eye doctor- not shingles, no treatment, seen by new PCP- dosepack, helped some then flared again, used hc 1% with some improvement, flaring, seen at [facility]- given vatrex no change, seen by pcp again and with recommendations from their Derm dept to start metrocream and doxy bid 100mg  Had used these for 4  days with no change and stopped- that had worked in past for flares of rosacea. Used hc 1% , stopped last week. Now appearing on left eyelid also    Past notes/ records and appropriate/relevant lab results including pathology and past body maps reviewed. Updated and new information noted in current visit.     Follow-up patient with history of rosacea, AK's, no personal or family history of skin cancer.    Has been careful to wear sunscreen.    Notes several concerns  Patient presents with concerns above.    Patient has been in their usual state of health.  History, medications, allergies reviewed as noted.      ROS:  Denies any other systemic complaints.  No new or changeing lesions other than noted above. No fevers, chills, night sweats, unusual sun sensitivity.  No other skin complaints.        History, medications, allergies reviewed as noted.       Physical Examination:     Well-developed well-nourished patient alert  oriented in no acute distress.  Exam performed, including scalp, head, neck, face,nails, hair, external eyes, including conjunctival mucosa, eyelids, lips external ears , arms, digits,palms.     Multiple light to medium brown, well marginated, uniformly pigmented, macules and papules 6 mm and less scattered on exam. pigmented lesions examined with dermoscopy benign-appearing patterns.     Waxy tannish keratotic papules scattered, cherry-red vascular papules scattered.    See map today's date for lesions noted .  See assessment and plan below for specific lesions.    Otherwise remarkable for lesions as noted on map.      Assessment / plan:    No orders of the defined types were placed in this encounter.      Meds & Refills for this Visit:  Requested Prescriptions     Signed Prescriptions Disp Refills    pimecrolimus (ELIDEL) 1 % External Cream 30 g 1     Sig: Use bid as directed to affected areas on face         Encounter Diagnoses   Name Primary?    Rosacea Yes    AK (actinic keratosis)     SK (seborrheic keratosis)     Lentigo     Benign neoplasm of skin, unspecified location        Erythematous scaling keratotic papules noted at sites noted on map  Actinic Keratoses.  Precancerous nature discussed. Sun protection, sunscreen/ blocks encouraged Lesions treated with cryo- .  Biopsy if not resolved.    R arm hand x2    Verrucoid keratoses neck, arms hand cryo- observe    Multiple waxy tan papules temporal scalp, scattered more diffusely            Granulomatous inflammatory papules over eyelids and along lower lids with background erythema-   Erythema over the central face eyelid with slight telangiectasia left cheek  Granulomatous rosacea on eyelids   Overlap with perioral dermatitis.  Weather, seasonal allergies/ change in environment now with moving may be contributing.   Too shortr a course of doxy and metronidazole   Resume doxy 100mg every day for at least 2 weeks, may take 6-8 weeks. Take with food  Use  metronidazole bid  Add elidel bid over the metronidazole.  If improving may taper the  doxycycline to every other day x 2 weeks.  Continue the metronidazole and elidel until clear x 2-3 weeks, then decrease to every day x 2-3 weeks then stop if clear.  May need to resume if symptoms flare, ok to use elidel long term  Overall does not appear to be contact dermatitis, no evidence of psoriasis  Rosacea.  Meds in grid.  Skin care instructions reviewed.  Pathophysiology reviewed.  Chronic recurrent nature discussed.  Patient will let us know how they are doing over the next several weeks.  Await clinical response to above therapy.      Continue topicals presently doing well continue careful sun protection    Papular lesion at glabella 4 mm dome-shaped flesh-colored papule, nasolabial fold slightly pearly 3 mm x 4 mm papule.  Biopsy lesions if these persist.  Lesion stable    Mult sk's at hairline, arms back ext lentigenes     Waxy tan keratotic papules lesions in areas of concern as noted reassurance given.  Benign nature discussed.  Possibility of cryo, alphahydroxy acids over-the-counter retinol's discussed.    No other susupicious lesions on todays  exam.  No other new suspicious lesions    Please refer to map for specific lesions.  See additional diagnoses.  Pros cons of various therapies, risks benefits discussed.Pathophysiology discussed with patient.  Therapeutic options reviewed.  See  Medications in grid.  Instructions reviewed at length.    Benign nevi, seborrheic  keratoses, cherry angiomas:  Reassurance regarding other benign skin lesions.Signs and symptoms of skin cancer, ABCDE's of melanoma discussed with patient. Sunscreen use, sun protection, self exams reviewed.  Followup as noted RTC ---routine checkup    6 mos -one year or p.r.n.  Encounter Times   Including precharting, reviewing chart, prior notes obtaining history, medical exam, notes on body map, plan, counseling, discussion of therapeutic options,  patient education, orders more than half total time spent in face to face time with patient.  My total time spent caring for the patient on the day of the encounter: 35 minutes     The patient indicates understanding of these issues and agrees to the plan.  The patient is asked to return as noted in follow-up/ above.    This note was generated using Dragon voice recognition software.  Please contact me regarding any confusion resulting from errors in recognition.

## 2024-07-29 NOTE — TELEPHONE ENCOUNTER
Fax from [facility] pharmacy.     Pimecrolimus 1% is not covered. PA required. Fax placed in triage bin.

## 2024-08-01 ENCOUNTER — HOSPITAL ENCOUNTER (OUTPATIENT)
Dept: REHABILITATION | Age: 69
Discharge: STILL ADMITTED OR ADMINISTRATIVE | End: 2024-08-01
Attending: FAMILY MEDICINE

## 2024-08-01 PROCEDURE — 97035 APP MDLTY 1+ULTRASOUND EA 15: CPT | Performed by: PHYSICAL THERAPIST

## 2024-08-01 PROCEDURE — 97110 THERAPEUTIC EXERCISES: CPT | Performed by: PHYSICAL THERAPIST

## 2024-08-01 PROCEDURE — 10004173 HB COUNTER-THERAPY VISIT PT: Performed by: PHYSICAL THERAPIST

## 2024-08-01 PROCEDURE — 97530 THERAPEUTIC ACTIVITIES: CPT | Performed by: PHYSICAL THERAPIST

## 2024-08-01 ASSESSMENT — MOVEMENT AND STRENGTH ASSESSMENTS
SITTING FOR 1 HOUR: A LITTLE BIT OF DIFFICULTY
WALKING A MILE: QUITE A BIT OF DIFFICULTY
STANDING FOR 1 HOUR: A LITTLE BIT OF DIFFICULTY
WALKING 2 BLOCKS: A LITTLE BIT OF DIFFICULTY
YOUR USUAL HOBBIES, RECREATIONAL OR SPORTING ACTIVIITIES: EXTREME DIFFICULTY OR UNABLE TO PERFORM ACTIVITY
RUNNING ON EVEN GROUND: EXTREME DIFFICULTY OR UNABLE TO PERFORM ACTIVITY
RUNNING ON UNEVEN GROUND: EXTREME DIFFICULTY OR UNABLE TO PERFORM ACTIVITY
PERFORMING LIGHT ACTIVITES AROUND YOUR HOME: A LITTLE BIT OF DIFFICULTY
GETTING INTO OR OUT OF THE BATH: EXTREME DIFFICULTY OR UNABLE TO PERFORM ACTIVITY
GOING UP OR DOWN 10 STAIRS (ABOUT 1 FLIGHT OF STAIRS): MODERATE DIFFICULTY
WALKING BETWEEN ROOMS: NO DIFFICULTY
PERFORMING HEAVY ACTIVITIES AROUND YOUR HOME: MODERATE DIFFICULTY
SQUATTING: QUITE A BIT OF DIFFICULTY
TOTAL SCORE: 42.5
MAKING SHARP TURNS WHILE RUNNING FAST: EXTREME DIFFICULTY OR UNABLE TO PERFORM ACTIVITY
ROLLING OVER IN BED: A LITTLE BIT OF DIFFICULTY
HOPPING: EXTREME DIFFICULTY OR UNABLE TO PERFORM ACTIVITY
PUTTING ON YOUR SHOES OR SOCKS: A LITTLE BIT OF DIFFICULTY
ANY OF YOUR USUAL WORK, HOUSEWORK OR SCHOOL ACTIVITIES: EXTREME DIFFICULTY OR UNABLE TO PERFORM ACTIVITY
GETTING INTO OR OUT OF A CAR: A LITTLE BIT OF DIFFICULTY
LIFTING AN OBJECT, LIKE A BAG OF GROCERIES, FROM THE FLOOR: A LITTLE BIT OF DIFFICULTY

## 2024-08-01 ASSESSMENT — ENCOUNTER SYMPTOMS: PAIN SEVERITY NOW: 2

## 2024-08-08 ENCOUNTER — HOSPITAL ENCOUNTER (OUTPATIENT)
Dept: REHABILITATION | Age: 69
Discharge: STILL ADMITTED OR ADMINISTRATIVE | End: 2024-08-08
Attending: FAMILY MEDICINE

## 2024-08-08 PROCEDURE — 10004173 HB COUNTER-THERAPY VISIT PT: Performed by: PHYSICAL THERAPIST

## 2024-08-08 PROCEDURE — 97530 THERAPEUTIC ACTIVITIES: CPT | Performed by: PHYSICAL THERAPIST

## 2024-08-08 PROCEDURE — 97110 THERAPEUTIC EXERCISES: CPT | Performed by: PHYSICAL THERAPIST

## 2024-08-08 PROCEDURE — 97140 MANUAL THERAPY 1/> REGIONS: CPT | Performed by: PHYSICAL THERAPIST

## 2024-08-08 ASSESSMENT — ENCOUNTER SYMPTOMS: PAIN SEVERITY NOW: 4

## 2024-08-15 ENCOUNTER — HOSPITAL ENCOUNTER (OUTPATIENT)
Dept: REHABILITATION | Age: 69
Discharge: STILL ADMITTED OR ADMINISTRATIVE | End: 2024-08-15
Attending: FAMILY MEDICINE

## 2024-08-15 PROCEDURE — 97110 THERAPEUTIC EXERCISES: CPT | Performed by: PHYSICAL THERAPIST

## 2024-08-15 PROCEDURE — 10004173 HB COUNTER-THERAPY VISIT PT: Performed by: PHYSICAL THERAPIST

## 2024-08-15 PROCEDURE — 97530 THERAPEUTIC ACTIVITIES: CPT | Performed by: PHYSICAL THERAPIST

## 2024-08-15 PROCEDURE — 97140 MANUAL THERAPY 1/> REGIONS: CPT | Performed by: PHYSICAL THERAPIST

## 2024-08-15 ASSESSMENT — MOVEMENT AND STRENGTH ASSESSMENTS
SITTING FOR 1 HOUR: NO DIFFICULTY
GETTING INTO OR OUT OF A CAR: NO DIFFICULTY
WALKING 2 BLOCKS: A LITTLE BIT OF DIFFICULTY
RUNNING ON EVEN GROUND: EXTREME DIFFICULTY OR UNABLE TO PERFORM ACTIVITY
MAKING SHARP TURNS WHILE RUNNING FAST: EXTREME DIFFICULTY OR UNABLE TO PERFORM ACTIVITY
YOUR USUAL HOBBIES, RECREATIONAL OR SPORTING ACTIVIITIES: A LITTLE BIT OF DIFFICULTY
PERFORMING LIGHT ACTIVITES AROUND YOUR HOME: NO DIFFICULTY
GETTING INTO OR OUT OF THE BATH: A LITTLE BIT OF DIFFICULTY
SQUATTING: MODERATE DIFFICULTY
ANY OF YOUR USUAL WORK, HOUSEWORK OR SCHOOL ACTIVITIES: A LITTLE BIT OF DIFFICULTY
RUNNING ON UNEVEN GROUND: EXTREME DIFFICULTY OR UNABLE TO PERFORM ACTIVITY
GOING UP OR DOWN 10 STAIRS (ABOUT 1 FLIGHT OF STAIRS): A LITTLE BIT OF DIFFICULTY
LIFTING AN OBJECT, LIKE A BAG OF GROCERIES, FROM THE FLOOR: A LITTLE BIT OF DIFFICULTY
ROLLING OVER IN BED: A LITTLE BIT OF DIFFICULTY
PERFORMING HEAVY ACTIVITIES AROUND YOUR HOME: A LITTLE BIT OF DIFFICULTY
HOPPING: EXTREME DIFFICULTY OR UNABLE TO PERFORM ACTIVITY
TOTAL SCORE: 65
STANDING FOR 1 HOUR: NO DIFFICULTY
PUTTING ON YOUR SHOES OR SOCKS: NO DIFFICULTY
WALKING BETWEEN ROOMS: NO DIFFICULTY
WALKING A MILE: MODERATE DIFFICULTY

## 2024-08-15 ASSESSMENT — ENCOUNTER SYMPTOMS: PAIN SEVERITY NOW: 3

## 2024-09-05 ENCOUNTER — APPOINTMENT (OUTPATIENT)
Dept: DERMATOLOGY | Age: 69
End: 2024-09-05
Attending: NURSE PRACTITIONER

## 2024-09-20 ENCOUNTER — WALK IN (OUTPATIENT)
Dept: URGENT CARE | Age: 69
End: 2024-09-20

## 2024-09-20 VITALS
WEIGHT: 141 LBS | HEART RATE: 72 BPM | DIASTOLIC BLOOD PRESSURE: 63 MMHG | TEMPERATURE: 97.4 F | SYSTOLIC BLOOD PRESSURE: 134 MMHG | HEIGHT: 62 IN | OXYGEN SATURATION: 99 % | BODY MASS INDEX: 25.95 KG/M2 | RESPIRATION RATE: 14 BRPM
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
## 2024-09-20 DIAGNOSIS — S40.862A INSECT BITE OF LEFT UPPER ARM, INITIAL ENCOUNTER: Primary | ICD-10-CM

## 2024-09-20 RX ORDER — MUPIROCIN 20 MG/G
OINTMENT TOPICAL 3 TIMES DAILY
Qty: 22 G | Refills: 0 | Status: SHIPPED | OUTPATIENT
Start: 2024-09-20 | End: 2024-09-27

## 2024-09-20 RX ORDER — TRIAMCINOLONE ACETONIDE 1 MG/G
CREAM TOPICAL
Qty: 30 G | Refills: 0 | Status: SHIPPED | OUTPATIENT
Start: 2024-09-20

## 2024-09-20 ASSESSMENT — ENCOUNTER SYMPTOMS
DIARRHEA: 0
CHILLS: 0
SORE THROAT: 0
NUMBNESS: 0
VOMITING: 0
FEVER: 0
WEAKNESS: 0
SHORTNESS OF BREATH: 0
WHEEZING: 0

## 2024-12-16 ENCOUNTER — TELEPHONE (OUTPATIENT)
Dept: FAMILY MEDICINE | Age: 69
End: 2024-12-16

## 2025-01-29 ENCOUNTER — TELEPHONE (OUTPATIENT)
Dept: FAMILY MEDICINE | Age: 70
End: 2025-01-29

## 2025-04-25 ENCOUNTER — APPOINTMENT (OUTPATIENT)
Dept: FAMILY MEDICINE | Age: 70
End: 2025-04-25

## 2025-04-25 VITALS
HEIGHT: 62 IN | HEART RATE: 87 BPM | SYSTOLIC BLOOD PRESSURE: 130 MMHG | WEIGHT: 143.7 LBS | OXYGEN SATURATION: 97 % | BODY MASS INDEX: 26.44 KG/M2 | DIASTOLIC BLOOD PRESSURE: 80 MMHG

## 2025-04-25 DIAGNOSIS — M54.2 CHRONIC NECK PAIN: ICD-10-CM

## 2025-04-25 DIAGNOSIS — Z13.220 LIPID SCREENING: ICD-10-CM

## 2025-04-25 DIAGNOSIS — Z00.00 MEDICARE ANNUAL WELLNESS VISIT, INITIAL: Primary | ICD-10-CM

## 2025-04-25 DIAGNOSIS — Z13.0 SCREENING, ANEMIA, DEFICIENCY, IRON: ICD-10-CM

## 2025-04-25 DIAGNOSIS — G89.29 CHRONIC NECK PAIN: ICD-10-CM

## 2025-04-25 DIAGNOSIS — Z13.1 SCREENING FOR DIABETES MELLITUS: ICD-10-CM

## 2025-04-25 DIAGNOSIS — Z12.31 ENCOUNTER FOR SCREENING MAMMOGRAM FOR MALIGNANT NEOPLASM OF BREAST: ICD-10-CM

## 2025-04-25 ASSESSMENT — VISUAL ACUITY
OD_CC: 20/30
OS_CC: 20/30

## 2025-04-25 ASSESSMENT — PATIENT HEALTH QUESTIONNAIRE - PHQ9
1. LITTLE INTEREST OR PLEASURE IN DOING THINGS: NOT AT ALL
CLINICAL INTERPRETATION OF PHQ2 SCORE: NO FURTHER SCREENING NEEDED
SUM OF ALL RESPONSES TO PHQ9 QUESTIONS 1 AND 2: 0
1. LITTLE INTEREST OR PLEASURE IN DOING THINGS: NOT AT ALL
2. FEELING DOWN, DEPRESSED OR HOPELESS: NOT AT ALL
SUM OF ALL RESPONSES TO PHQ9 QUESTIONS 1 AND 2: 0
CLINICAL INTERPRETATION OF PHQ2 SCORE: NO FURTHER SCREENING NEEDED
SUM OF ALL RESPONSES TO PHQ9 QUESTIONS 1 AND 2: 0
SUM OF ALL RESPONSES TO PHQ9 QUESTIONS 1 AND 2: 0
2. FEELING DOWN, DEPRESSED OR HOPELESS: NOT AT ALL

## 2025-04-29 ENCOUNTER — APPOINTMENT (OUTPATIENT)
Dept: LAB | Age: 70
End: 2025-04-29

## 2025-04-29 DIAGNOSIS — Z13.0 SCREENING, ANEMIA, DEFICIENCY, IRON: ICD-10-CM

## 2025-04-29 DIAGNOSIS — Z13.1 SCREENING FOR DIABETES MELLITUS: ICD-10-CM

## 2025-04-29 DIAGNOSIS — Z13.220 LIPID SCREENING: ICD-10-CM

## 2025-04-29 LAB
BASOPHILS # BLD: 0 K/MCL (ref 0–0.3)
BASOPHILS NFR BLD: 0 %
DEPRECATED RDW RBC: 47.9 FL (ref 39–50)
EOSINOPHIL # BLD: 0.3 K/MCL (ref 0–0.5)
EOSINOPHIL NFR BLD: 4 %
ERYTHROCYTE [DISTWIDTH] IN BLOOD: 14.2 % (ref 11–15)
HBA1C MFR BLD: 5.3 % (ref 4.5–5.6)
HCT VFR BLD CALC: 41.4 % (ref 36–46.5)
HGB BLD-MCNC: 13 G/DL (ref 12–15.5)
IMM GRANULOCYTES # BLD AUTO: 0 K/MCL (ref 0–0.2)
IMM GRANULOCYTES # BLD: 0 %
LYMPHOCYTES # BLD: 2 K/MCL (ref 1–4)
LYMPHOCYTES NFR BLD: 28 %
MCH RBC QN AUTO: 28.8 PG (ref 26–34)
MCHC RBC AUTO-ENTMCNC: 31.4 G/DL (ref 32–36.5)
MCV RBC AUTO: 91.6 FL (ref 78–100)
MONOCYTES # BLD: 0.5 K/MCL (ref 0.3–0.9)
MONOCYTES NFR BLD: 7 %
NEUTROPHILS # BLD: 4.2 K/MCL (ref 1.8–7.7)
NEUTROPHILS NFR BLD: 61 %
NRBC BLD MANUAL-RTO: 0 /100 WBC
PLATELET # BLD AUTO: 269 K/MCL (ref 140–450)
RBC # BLD: 4.52 MIL/MCL (ref 4–5.2)
WBC # BLD: 7 K/MCL (ref 4.2–11)

## 2025-04-29 PROCEDURE — 80061 LIPID PANEL: CPT | Performed by: CLINICAL MEDICAL LABORATORY

## 2025-04-29 PROCEDURE — 85025 COMPLETE CBC W/AUTO DIFF WBC: CPT | Performed by: CLINICAL MEDICAL LABORATORY

## 2025-04-29 PROCEDURE — 80053 COMPREHEN METABOLIC PANEL: CPT | Performed by: CLINICAL MEDICAL LABORATORY

## 2025-04-29 PROCEDURE — 36415 COLL VENOUS BLD VENIPUNCTURE: CPT | Performed by: INTERNAL MEDICINE

## 2025-04-29 PROCEDURE — 83036 HEMOGLOBIN GLYCOSYLATED A1C: CPT | Performed by: CLINICAL MEDICAL LABORATORY

## 2025-04-30 LAB
ALBUMIN SERPL-MCNC: 3.8 G/DL (ref 3.4–5)
ALBUMIN/GLOB SERPL: 1.2 {RATIO} (ref 1–2.4)
ALP SERPL-CCNC: 65 UNITS/L (ref 45–117)
ALT SERPL-CCNC: 25 UNITS/L
ANION GAP SERPL CALC-SCNC: 12 MMOL/L (ref 7–19)
AST SERPL-CCNC: 15 UNITS/L
BILIRUB SERPL-MCNC: 0.5 MG/DL (ref 0.2–1)
BUN SERPL-MCNC: 19 MG/DL (ref 6–20)
BUN/CREAT SERPL: 31 (ref 7–25)
CALCIUM SERPL-MCNC: 9.2 MG/DL (ref 8.4–10.2)
CHLORIDE SERPL-SCNC: 106 MMOL/L (ref 97–110)
CHOLEST SERPL-MCNC: 211 MG/DL
CHOLEST/HDLC SERPL: 4.5 {RATIO}
CO2 SERPL-SCNC: 28 MMOL/L (ref 21–32)
CREAT SERPL-MCNC: 0.62 MG/DL (ref 0.51–0.95)
EGFRCR SERPLBLD CKD-EPI 2021: >90 ML/MIN/{1.73_M2}
FASTING DURATION TIME PATIENT: 12 HOURS (ref 0–999)
GLOBULIN SER-MCNC: 3.3 G/DL (ref 2–4)
GLUCOSE SERPL-MCNC: 105 MG/DL (ref 70–99)
HDLC SERPL-MCNC: 47 MG/DL
LDLC SERPL CALC-MCNC: 126 MG/DL
NONHDLC SERPL-MCNC: 164 MG/DL
POTASSIUM SERPL-SCNC: 4.3 MMOL/L (ref 3.4–5.1)
PROT SERPL-MCNC: 7.1 G/DL (ref 6.4–8.2)
SODIUM SERPL-SCNC: 142 MMOL/L (ref 135–145)
TRIGL SERPL-MCNC: 191 MG/DL

## 2025-05-01 ENCOUNTER — RESULTS FOLLOW-UP (OUTPATIENT)
Dept: FAMILY MEDICINE | Age: 70
End: 2025-05-01

## 2025-05-01 DIAGNOSIS — E78.5 DYSLIPIDEMIA: Primary | ICD-10-CM

## 2025-05-19 ENCOUNTER — HOSPITAL ENCOUNTER (OUTPATIENT)
Dept: CT IMAGING | Age: 70
Discharge: HOME | End: 2025-05-19
Attending: FAMILY MEDICINE

## 2025-05-19 DIAGNOSIS — E78.5 DYSLIPIDEMIA: ICD-10-CM

## 2025-05-19 PROCEDURE — 75571 CT HRT W/O DYE W/CA TEST: CPT

## 2025-05-27 ENCOUNTER — RESULTS FOLLOW-UP (OUTPATIENT)
Dept: FAMILY MEDICINE | Age: 70
End: 2025-05-27

## 2025-06-19 ENCOUNTER — HOSPITAL ENCOUNTER (OUTPATIENT)
Dept: MAMMOGRAPHY | Age: 70
Discharge: HOME | End: 2025-06-19
Attending: NURSE PRACTITIONER
Payer: MEDICARE

## 2025-06-19 DIAGNOSIS — Z12.31 ENCOUNTER FOR SCREENING MAMMOGRAM FOR MALIGNANT NEOPLASM OF BREAST: ICD-10-CM

## 2025-06-19 PROCEDURE — 77063 BREAST TOMOSYNTHESIS BI: CPT | Performed by: NURSE PRACTITIONER

## 2025-06-19 PROCEDURE — 77067 SCR MAMMO BI INCL CAD: CPT | Performed by: NURSE PRACTITIONER

## 2025-06-23 ENCOUNTER — APPOINTMENT (OUTPATIENT)
Dept: FAMILY MEDICINE | Age: 70
End: 2025-06-23

## 2026-04-27 ENCOUNTER — APPOINTMENT (OUTPATIENT)
Dept: FAMILY MEDICINE | Age: 71
End: 2026-04-27

## (undated) DIAGNOSIS — K21.9 GASTROESOPHAGEAL REFLUX DISEASE, UNSPECIFIED WHETHER ESOPHAGITIS PRESENT: ICD-10-CM

## (undated) DIAGNOSIS — Z87.19 HISTORY OF DIVERTICULITIS: Primary | ICD-10-CM

## (undated) DEVICE — MEDI-VAC NON-CONDUCTIVE SUCTION TUBING 6MM X 1.8M (6FT.) L: Brand: CARDINAL HEALTH

## (undated) DEVICE — KIT CLEAN ENDOKIT 1.1OZ GOWNX2

## (undated) DEVICE — PLASTIC HAND: Brand: MEDLINE INDUSTRIES, INC.

## (undated) DEVICE — STERILE TETRA-FLEX CF, ELASTIC BANDAGE, 2" X 5.5YD: Brand: TETRA-FLEX™CF

## (undated) DEVICE — SOL  .9 1000ML BTL

## (undated) DEVICE — ESMARK: Brand: DEROYAL

## (undated) DEVICE — STERILE LATEX POWDER-FREE SURGICAL GLOVESWITH NITRILE COATING: Brand: PROTEXIS

## (undated) DEVICE — CAST PADDING SYNTH 3

## (undated) DEVICE — FORCEP RADIAL JAW 4

## (undated) DEVICE — LINE MNTR ADLT SET O2 INTMD

## (undated) DEVICE — UNDYED BRAIDED (POLYGLACTIN 910), SYNTHETIC ABSORBABLE SUTURE: Brand: COATED VICRYL

## (undated) DEVICE — ZIMMER® STERILE DISPOSABLE TOURNIQUET CUFF WITH PLC, DUAL PORT, SINGLE BLADDER, 18 IN. (46 CM)

## (undated) DEVICE — KIT ENDO ORCAPOD 160/180/190

## (undated) DEVICE — SUTURE ETHILON 4-0 699G

## (undated) DEVICE — SPLINT ORTH 12X2IN 1 LYR CMFT

## (undated) NOTE — LETTER
Date & Time: 6/29/2018, 11:47 PM  Patient: Rachel Love  Encounter Provider(s):    Dallas Morales Attending  Ernesto Zacarias MD       To Whom It May Concern:    Aaliyah Delgado was seen and treated in our department on 6/29/2018.  She should not retu

## (undated) NOTE — LETTER
18      Patient: Joseph Negron  : 1955 Visit date: 2018    Dear Juan Daniel Drew,      I examined your patient in consultation today. She is 3 weeks post excision of a left wrist ganglion. Her preoperative symptoms are gone.   Graham Antony

## (undated) NOTE — ED AVS SNAPSHOT
Guanaco Jacobs   MRN: [id number]    Department:  [facility] Emergency Department   Date of Visit:  6/29/2018           Disclosure     Insurance plans vary and the physician(s) referred by the ER may not be covered by your plan.  Please contact within the next three months to obtain basic health screening including reassessment of your blood pressure.     IF THERE IS ANY CHANGE OR WORSENING OF YOUR CONDITION, CALL YOUR PRIMARY CARE PHYSICIAN AT ONCE OR RETURN IMMEDIATELY TO THE EMERGENCY DEPARTMEN

## (undated) NOTE — MR AVS SNAPSHOT
[facility] SPECIALTY \[facility]\"" - [place] [address] [address]  [id number]  [phone]               Thank you for choosing us for your health care visit with Flip Carlton MD.  We are glad to serve you and happy to provide you with this summary of [employer] will allow you to access patient instructions from your recent visit,  view other health information, and more. To sign up or find more information, go to [email or web address] and click on the Sign Up Now link in the [employer] box.      Enter 2 ½ hours per week – spread out over time Use a jenna to keep you motivated   Don’t forget strength training with weights and resistance Set goals and track your progress   You don’t need to join a gym. Home exercises work great.  Put more priority on exe

## (undated) NOTE — LETTER
18      Patient: Yonathan Villagomez  : 1955 Visit date: 2018    Dear Rosa Bhatti,      I examined your patient in consultation today. She has an enlarging, painful ganglion of the left wrist.  She will be scheduled for excision.